# Patient Record
Sex: FEMALE | Race: WHITE | NOT HISPANIC OR LATINO | Employment: OTHER
[De-identification: names, ages, dates, MRNs, and addresses within clinical notes are randomized per-mention and may not be internally consistent; named-entity substitution may affect disease eponyms.]

---

## 2017-06-10 ENCOUNTER — HEALTH MAINTENANCE LETTER (OUTPATIENT)
Age: 62
End: 2017-06-10

## 2018-08-10 ENCOUNTER — THERAPY VISIT (OUTPATIENT)
Dept: PHYSICAL THERAPY | Facility: CLINIC | Age: 63
End: 2018-08-10
Payer: COMMERCIAL

## 2018-08-10 DIAGNOSIS — M25.562 LEFT KNEE PAIN: ICD-10-CM

## 2018-08-10 DIAGNOSIS — Z96.659 S/P KNEE REPLACEMENT: Primary | ICD-10-CM

## 2018-08-10 PROCEDURE — 97110 THERAPEUTIC EXERCISES: CPT | Mod: GP | Performed by: PHYSICAL THERAPIST

## 2018-08-10 PROCEDURE — 97161 PT EVAL LOW COMPLEX 20 MIN: CPT | Mod: GP | Performed by: PHYSICAL THERAPIST

## 2018-08-10 ASSESSMENT — ACTIVITIES OF DAILY LIVING (ADL)
AS_A_RESULT_OF_YOUR_KNEE_INJURY,_HOW_WOULD_YOU_RATE_YOUR_CURRENT_LEVEL_OF_DAILY_ACTIVITY?: ABNORMAL
GO UP STAIRS: ACTIVITY IS SOMEWHAT DIFFICULT
SIT WITH YOUR KNEE BENT: ACTIVITY IS VERY DIFFICULT
HOW_WOULD_YOU_RATE_THE_CURRENT_FUNCTION_OF_YOUR_KNEE_DURING_YOUR_USUAL_DAILY_ACTIVITIES_ON_A_SCALE_FROM_0_TO_100_WITH_100_BEING_YOUR_LEVEL_OF_KNEE_FUNCTION_PRIOR_TO_YOUR_INJURY_AND_0_BEING_THE_INABILITY_TO_PERFORM_ANY_OF_YOUR_USUAL_DAILY_ACTIVITIES?: 50
SQUAT: I AM UNABLE TO DO THE ACTIVITY
LIMPING: NOT ANSWERED
KNEE_ACTIVITY_OF_DAILY_LIVING_SUM: 24
KNEEL ON THE FRONT OF YOUR KNEE: I AM UNABLE TO DO THE ACTIVITY
PAIN: I DO NOT HAVE THE SYMPTOM
WEAKNESS: NOT ANSWERED
RISE FROM A CHAIR: ACTIVITY IS SOMEWHAT DIFFICULT
WALK: ACTIVITY IS SOMEWHAT DIFFICULT
GO DOWN STAIRS: ACTIVITY IS SOMEWHAT DIFFICULT
SWELLING: NOT ANSWERED
STIFFNESS: THE SYMPTOM AFFECTS MY ACTIVITY SLIGHTLY
GIVING WAY, BUCKLING OR SHIFTING OF KNEE: NOT ANSWERED
HOW_WOULD_YOU_RATE_THE_OVERALL_FUNCTION_OF_YOUR_KNEE_DURING_YOUR_USUAL_DAILY_ACTIVITIES?: SEVERELY ABNORMAL
STAND: ACTIVITY IS SOMEWHAT DIFFICULT

## 2018-08-10 NOTE — LETTER
[facility]  [address]  [facility] [address]  [phone]    2018    Re: Cait Raza   :   1955  MRN:  [id number]   REFERRING PHYSICIAN:   Lee Moore    [facility]    Date of Initial Evaluation: 8/10/2018  Visits:  Rxs Used: 1  Reason for Referral:     S/P knee replacement  Left knee pain    EVALUATION SUMMARY  [facility] Initial Evaluation  Subjective:  Patient is a 63 year old female presenting with rehab left knee hpi. The history is provided by the patient.   Cait Raza is a 63 year old female with a left knee condition.      This is a new condition  Patient had left medial compartmental knee replacement on 2018.  She reports a long history of left knee problems and thinks she went into the surgery with a mild loss of extension.  Currently patient rates her pain at 7/10..    Patient reports pain:  In the joint.    Pain is described as aching and sharp and is constant and reported as 7/10.  Associated symptoms:  Edema, loss of strength and loss of motion/stiffness. Pain is the same all the time.  Symptoms are exacerbated by weight bearing and walking (bending knee) and relieved by analgesics.  Since onset symptoms are gradually improving.        General health as reported by patient is good. Pertinent medical history includes:  Cancer, overweight, implanted devices and menopausal. Other surgeries include:  Cancer surgery and orthopedic surgery.  Current medications:  Anti-inflammatory and pain medication.  Current occupation is retired.                   Objective:    Gait:    Gait Type:  Antalgic   Assistive Devices:  Walker  Deviations:  Knee:  Knee extension decr L and knee flexion decr L    Knee Evaluation:  ROM:  Strength wnl knee: patient is able to complete 10 independent SLR with mild extensor lag.  AROM    Hyperextension: Left:  0     Right:  Extension: Left: 10    Right:   Flexion: Left: 85   Right:    Strength:   Quad Set Left: Fair    Pain:     Edema:  Edema of the knee: moderate consistant with recent knee replacement.    Assessment/Plan:    Patient is a 63 year old female with left side knee complaints.    Patient has the following significant findings with corresponding treatment plan.                Diagnosis 1:  S/p left unicompartmental knee replacement  Pain -  manual therapy, self management, education and home program  Decreased ROM/flexibility - manual therapy and therapeutic exercise  Decreased strength - therapeutic exercise and therapeutic activities  Impaired gait - gait training  Decreased function - therapeutic activities    Therapy Evaluation Codes:   1) History comprised of:   Personal factors that impact the plan of care:      None.    Comorbidity factors that impact the plan of care are:      None.     Medications impacting care: None.  2) Examination of Body Systems comprised of:   Body structures and functions that impact the plan of care:      Knee.   Activity limitations that impact the plan of care are:      Stairs and Walking.  3) Clinical presentation characteristics are:   Stable/Uncomplicated.  4) Decision-Making    Low complexity using standardized patient assessment instrument and/or measureable assessment of functional outcome.  Cumulative Therapy Evaluation is: Low complexity.    Previous and current functional limitations:  (See Goal Flow Sheet for this information)    Short term and Long term goals: (See Goal Flow Sheet for this information)     Communication ability:  Patient appears to be able to clearly communicate and understand verbal and written communication and follow directions correctly.  Treatment Explanation - The following has been discussed with the patient:   RX ordered/plan of care  Anticipated outcomes  Possible risks and side effects  This patient would benefit from PT intervention to resume  normal activities.   Rehab potential is good.    Frequency:     2-3X week, once daily  Duration:  for 6 visits  Discharge Plan:  Achieve all LTG.  Independent in home treatment program.  Reach maximal therapeutic benefit.    Please refer to the daily flowsheet for treatment today, total treatment time and time spent performing 1:1 timed codes.     Thank you for your referral.    INQUIRIES  Therapist: Mayra Kelley PT, \[facility]\""  [facility]  [address] [place], [place] [address] [address]  Phone: [phone]  Fax: [phone]

## 2018-08-10 NOTE — PROGRESS NOTES
[facility] Initial Evaluation  Subjective:  Patient is a 63 year old female presenting with rehab left knee hpi. The history is provided by the patient.   Cait Raza is a 63 year old female with a left knee condition.      This is a new condition  Patient had left medial compartmental knee replacement on 8/8/2018.  She reports a long history of left knee problems and thinks she went into the surgery with a mild loss of extension.  Currently patient rates her pain at 7/10..    Patient reports pain:  In the joint.    Pain is described as aching and sharp and is constant and reported as 7/10.  Associated symptoms:  Edema, loss of strength and loss of motion/stiffness. Pain is the same all the time.  Symptoms are exacerbated by weight bearing and walking (bending knee) and relieved by analgesics.  Since onset symptoms are gradually improving.        General health as reported by patient is good.                                              Objective:    Gait:    Gait Type:  Antalgic   Assistive Devices:  Walker  Deviations:  Knee:  Knee extension decr L and knee flexion decr L                                                      Knee Evaluation:  ROM:  Strength wnl knee: patient is able to complete 10 independent SLR with mild extensor lag.  AROM    Hyperextension: Left:  0    Right:  Extension: Left: 10    Right:   Flexion: Left: 85   Right:        Strength:         Quad Set Left: Fair    Pain:           Edema:  Edema of the knee: moderate consistant with recent knee replacement.            General     ROS    Assessment/Plan:    Patient is a 63 year old female with left side knee complaints.    Patient has the following significant findings with corresponding treatment plan.                Diagnosis 1:  S/p left unicompartmental knee replacement  Pain -  manual therapy, self management, education and home program  Decreased ROM/flexibility - manual therapy and therapeutic exercise  Decreased  strength - therapeutic exercise and therapeutic activities  Impaired gait - gait training  Decreased function - therapeutic activities    Therapy Evaluation Codes:   1) History comprised of:   Personal factors that impact the plan of care:      None.    Comorbidity factors that impact the plan of care are:      None.     Medications impacting care: None.  2) Examination of Body Systems comprised of:   Body structures and functions that impact the plan of care:      Knee.   Activity limitations that impact the plan of care are:      Stairs and Walking.  3) Clinical presentation characteristics are:   Stable/Uncomplicated.  4) Decision-Making    Low complexity using standardized patient assessment instrument and/or measureable assessment of functional outcome.  Cumulative Therapy Evaluation is: Low complexity.    Previous and current functional limitations:  (See Goal Flow Sheet for this information)    Short term and Long term goals: (See Goal Flow Sheet for this information)     Communication ability:  Patient appears to be able to clearly communicate and understand verbal and written communication and follow directions correctly.  Treatment Explanation - The following has been discussed with the patient:   RX ordered/plan of care  Anticipated outcomes  Possible risks and side effects  This patient would benefit from PT intervention to resume normal activities.   Rehab potential is good.    Frequency:     2-3X week, once daily  Duration:  for 6 visits  Discharge Plan:  Achieve all LTG.  Independent in home treatment program.  Reach maximal therapeutic benefit.    Please refer to the daily flowsheet for treatment today, total treatment time and time spent performing 1:1 timed codes.

## 2018-08-13 ENCOUNTER — THERAPY VISIT (OUTPATIENT)
Dept: PHYSICAL THERAPY | Facility: CLINIC | Age: 63
End: 2018-08-13
Payer: COMMERCIAL

## 2018-08-13 DIAGNOSIS — M25.562 LEFT KNEE PAIN: ICD-10-CM

## 2018-08-13 DIAGNOSIS — Z96.659 S/P KNEE REPLACEMENT: ICD-10-CM

## 2018-08-13 PROCEDURE — 97110 THERAPEUTIC EXERCISES: CPT | Mod: GP | Performed by: PHYSICAL THERAPIST

## 2018-08-13 PROCEDURE — 97140 MANUAL THERAPY 1/> REGIONS: CPT | Mod: GP | Performed by: PHYSICAL THERAPIST

## 2018-08-13 PROCEDURE — 97112 NEUROMUSCULAR REEDUCATION: CPT | Mod: GP | Performed by: PHYSICAL THERAPIST

## 2018-08-13 NOTE — PROGRESS NOTES
[facility] Initial Evaluation  Subjective:  Patient is a 63 year old female presenting with rehab left ankle/foot hpi.                                      Pertinent medical history includes:  Cancer, overweight, implanted devices and menopausal.    Other surgeries include:  Cancer surgery and orthopedic surgery.  Current medications:  Anti-inflammatory and pain medication.  Current occupation is retired.                                    Objective:  System    Physical Exam    General     ROS    Assessment/Plan:

## 2018-08-16 ENCOUNTER — THERAPY VISIT (OUTPATIENT)
Dept: PHYSICAL THERAPY | Facility: CLINIC | Age: 63
End: 2018-08-16
Payer: COMMERCIAL

## 2018-08-16 DIAGNOSIS — M25.562 LEFT KNEE PAIN: ICD-10-CM

## 2018-08-16 DIAGNOSIS — Z96.659 S/P KNEE REPLACEMENT: ICD-10-CM

## 2018-08-16 PROCEDURE — 97110 THERAPEUTIC EXERCISES: CPT | Mod: GP | Performed by: PHYSICAL THERAPIST

## 2018-08-16 PROCEDURE — 97112 NEUROMUSCULAR REEDUCATION: CPT | Mod: GP | Performed by: PHYSICAL THERAPIST

## 2018-08-16 PROCEDURE — 97140 MANUAL THERAPY 1/> REGIONS: CPT | Mod: GP | Performed by: PHYSICAL THERAPIST

## 2018-08-20 ENCOUNTER — THERAPY VISIT (OUTPATIENT)
Dept: PHYSICAL THERAPY | Facility: CLINIC | Age: 63
End: 2018-08-20
Payer: COMMERCIAL

## 2018-08-20 DIAGNOSIS — Z96.659 S/P KNEE REPLACEMENT: ICD-10-CM

## 2018-08-20 DIAGNOSIS — M25.562 LEFT KNEE PAIN: ICD-10-CM

## 2018-08-20 PROCEDURE — 97112 NEUROMUSCULAR REEDUCATION: CPT | Mod: GP | Performed by: PHYSICAL THERAPIST

## 2018-08-20 PROCEDURE — 97110 THERAPEUTIC EXERCISES: CPT | Mod: GP | Performed by: PHYSICAL THERAPIST

## 2018-08-20 PROCEDURE — 97140 MANUAL THERAPY 1/> REGIONS: CPT | Mod: GP | Performed by: PHYSICAL THERAPIST

## 2018-08-23 ENCOUNTER — THERAPY VISIT (OUTPATIENT)
Dept: PHYSICAL THERAPY | Facility: CLINIC | Age: 63
End: 2018-08-23
Payer: COMMERCIAL

## 2018-08-23 DIAGNOSIS — Z96.659 S/P KNEE REPLACEMENT: ICD-10-CM

## 2018-08-23 DIAGNOSIS — M25.562 LEFT KNEE PAIN: ICD-10-CM

## 2018-08-23 PROCEDURE — 97110 THERAPEUTIC EXERCISES: CPT | Mod: GP | Performed by: PHYSICAL THERAPIST

## 2018-08-23 PROCEDURE — 97140 MANUAL THERAPY 1/> REGIONS: CPT | Mod: GP | Performed by: PHYSICAL THERAPIST

## 2018-08-27 ENCOUNTER — THERAPY VISIT (OUTPATIENT)
Dept: PHYSICAL THERAPY | Facility: CLINIC | Age: 63
End: 2018-08-27
Payer: COMMERCIAL

## 2018-08-27 DIAGNOSIS — Z96.659 S/P KNEE REPLACEMENT: ICD-10-CM

## 2018-08-27 DIAGNOSIS — M25.562 LEFT KNEE PAIN: ICD-10-CM

## 2018-08-27 PROCEDURE — 97112 NEUROMUSCULAR REEDUCATION: CPT | Mod: GP | Performed by: PHYSICAL THERAPIST

## 2018-08-27 PROCEDURE — 97110 THERAPEUTIC EXERCISES: CPT | Mod: GP | Performed by: PHYSICAL THERAPIST

## 2018-08-27 PROCEDURE — 97140 MANUAL THERAPY 1/> REGIONS: CPT | Mod: GP | Performed by: PHYSICAL THERAPIST

## 2018-08-30 ENCOUNTER — THERAPY VISIT (OUTPATIENT)
Dept: PHYSICAL THERAPY | Facility: CLINIC | Age: 63
End: 2018-08-30
Payer: COMMERCIAL

## 2018-08-30 DIAGNOSIS — Z96.659 S/P KNEE REPLACEMENT: ICD-10-CM

## 2018-08-30 DIAGNOSIS — M25.562 LEFT KNEE PAIN: ICD-10-CM

## 2018-08-30 PROCEDURE — 97110 THERAPEUTIC EXERCISES: CPT | Mod: GP | Performed by: PHYSICAL THERAPIST

## 2018-08-30 PROCEDURE — 97112 NEUROMUSCULAR REEDUCATION: CPT | Mod: GP | Performed by: PHYSICAL THERAPIST

## 2018-08-30 PROCEDURE — 97140 MANUAL THERAPY 1/> REGIONS: CPT | Mod: GP | Performed by: PHYSICAL THERAPIST

## 2018-09-04 ENCOUNTER — THERAPY VISIT (OUTPATIENT)
Dept: PHYSICAL THERAPY | Facility: CLINIC | Age: 63
End: 2018-09-04
Payer: COMMERCIAL

## 2018-09-04 DIAGNOSIS — Z96.659 S/P KNEE REPLACEMENT: ICD-10-CM

## 2018-09-04 DIAGNOSIS — M25.562 LEFT KNEE PAIN: ICD-10-CM

## 2018-09-04 PROCEDURE — 97140 MANUAL THERAPY 1/> REGIONS: CPT | Mod: GP | Performed by: PHYSICAL THERAPIST

## 2018-09-04 PROCEDURE — 97112 NEUROMUSCULAR REEDUCATION: CPT | Mod: GP | Performed by: PHYSICAL THERAPIST

## 2018-09-04 PROCEDURE — 97110 THERAPEUTIC EXERCISES: CPT | Mod: GP | Performed by: PHYSICAL THERAPIST

## 2018-09-06 ENCOUNTER — THERAPY VISIT (OUTPATIENT)
Dept: PHYSICAL THERAPY | Facility: CLINIC | Age: 63
End: 2018-09-06
Payer: COMMERCIAL

## 2018-09-06 DIAGNOSIS — M25.562 LEFT KNEE PAIN: ICD-10-CM

## 2018-09-06 DIAGNOSIS — Z96.659 S/P KNEE REPLACEMENT: ICD-10-CM

## 2018-09-06 PROCEDURE — 97112 NEUROMUSCULAR REEDUCATION: CPT | Mod: GP | Performed by: PHYSICAL THERAPIST

## 2018-09-06 PROCEDURE — 97110 THERAPEUTIC EXERCISES: CPT | Mod: GP | Performed by: PHYSICAL THERAPIST

## 2018-09-06 PROCEDURE — 97140 MANUAL THERAPY 1/> REGIONS: CPT | Mod: GP | Performed by: PHYSICAL THERAPIST

## 2018-09-17 ENCOUNTER — THERAPY VISIT (OUTPATIENT)
Dept: PHYSICAL THERAPY | Facility: CLINIC | Age: 63
End: 2018-09-17
Payer: COMMERCIAL

## 2018-09-17 DIAGNOSIS — Z96.659 S/P KNEE REPLACEMENT: ICD-10-CM

## 2018-09-17 DIAGNOSIS — M25.562 LEFT KNEE PAIN: ICD-10-CM

## 2018-09-17 PROCEDURE — 97110 THERAPEUTIC EXERCISES: CPT | Mod: GP | Performed by: PHYSICAL THERAPIST

## 2018-09-17 PROCEDURE — 97112 NEUROMUSCULAR REEDUCATION: CPT | Mod: GP | Performed by: PHYSICAL THERAPIST

## 2018-09-17 PROCEDURE — 97140 MANUAL THERAPY 1/> REGIONS: CPT | Mod: GP | Performed by: PHYSICAL THERAPIST

## 2018-09-24 ENCOUNTER — THERAPY VISIT (OUTPATIENT)
Dept: PHYSICAL THERAPY | Facility: CLINIC | Age: 63
End: 2018-09-24
Payer: COMMERCIAL

## 2018-09-24 DIAGNOSIS — M25.562 LEFT KNEE PAIN: ICD-10-CM

## 2018-09-24 DIAGNOSIS — Z96.659 S/P KNEE REPLACEMENT: ICD-10-CM

## 2018-09-24 PROCEDURE — 97110 THERAPEUTIC EXERCISES: CPT | Mod: GP | Performed by: PHYSICAL THERAPIST

## 2018-09-24 PROCEDURE — 97140 MANUAL THERAPY 1/> REGIONS: CPT | Mod: GP | Performed by: PHYSICAL THERAPIST

## 2018-09-24 PROCEDURE — 97112 NEUROMUSCULAR REEDUCATION: CPT | Mod: GP | Performed by: PHYSICAL THERAPIST

## 2018-09-27 ENCOUNTER — THERAPY VISIT (OUTPATIENT)
Dept: PHYSICAL THERAPY | Facility: CLINIC | Age: 63
End: 2018-09-27
Payer: COMMERCIAL

## 2018-09-27 DIAGNOSIS — M25.512 SHOULDER PAIN, LEFT: Primary | ICD-10-CM

## 2018-09-27 PROCEDURE — 97161 PT EVAL LOW COMPLEX 20 MIN: CPT | Mod: GP | Performed by: PHYSICAL THERAPIST

## 2018-09-27 PROCEDURE — 97110 THERAPEUTIC EXERCISES: CPT | Mod: GP | Performed by: PHYSICAL THERAPIST

## 2018-09-27 NOTE — MR AVS SNAPSHOT
After Visit Summary   9/27/2018    Cait Raza    MRN: [id number]           Patient Information     Date Of Birth          1955        Visit Information        Provider Department      9/27/2018 10:00 AM Trinh Gomez, PT [facility]        Today's Diagnoses     Shoulder pain, left    -  1       Follow-ups after your visit        Your next 10 appointments already scheduled     Oct 01, 2018 10:00 AM CDT   SHAISTA Extremity with Trinh Gomez PT   [facility] (SHAISTA Taryn  )    [address]  [facility] [address] [phone]            Oct 08, 2018 12:00 PM CDT   SHAISTA Extremity with Trinh Gomez PT   [facility] (SHAISTA Taryn  )    [address] [place] [address] [address]  [facility] [id number] [phone]              Who to contact     If you have questions or need follow up information about today's clinic visit or your schedule please contact [facility] directly at [phone].  Normal or non-critical lab and imaging results will be communicated to you by EoeMobilehart, letter or phone within 4 business days after the clinic has received the results. If you do not hear from us within 7 days, please contact the clinic through EoeMobilehart or phone. If you have a critical or abnormal lab result, we will notify you by phone as soon as possible.  Submit refill requests through CloudCar or call your pharmacy and they will forward the refill request to us. Please allow 3 business days for your refill to be completed.          Additional Information About Your Visit        MyChart Information     CloudCar gives you secure access to your electronic health record. If you see a primary care provider, you can also send messages to your care team and make appointments. If you have questions, please call your primary care  clinic.  If you do not have a primary care provider, please call [phone] and they will assist you.        Care EveryWhere ID     This is your Care EveryWhere ID. This could be used by other organizations to access your [place] medical records  [id number]        Your Vitals Were     Last Period                   07/26/2004            Blood Pressure from Last 3 Encounters:   10/09/15 139/81   08/28/15 123/72   08/14/15 122/89    Weight from Last 3 Encounters:   10/09/15 80.8 kg (178 lb 1.6 oz)   08/28/15 79.3 kg (174 lb 14.4 oz)   08/14/15 78.4 kg (172 lb 14.4 oz)              We Performed the Following     HC PT EVAL, LOW COMPLEXITY     SHAISTA INITIAL EVAL REPORT     THERAPEUTIC EXERCISES        Primary Care Provider Office Phone # Fax #    James Graham [phone]       [facility] [address] [place] [id number]        Equal Access to Services     ANNIA PIRES : Hadii aad ku hadasho Soomaali, waaxda luqadaha, qaybta kaalmada adeegyada, waxay idiin haylloydn shubham alvarez . So [facility] [phone].    ATENCIÓN: Si lorena samuels, tiene a rodas disposición servicios gratuitos de asistencia lingüística. Llame al [phone].    We comply with applicable federal civil rights laws and [place] laws. We do not discriminate on the basis of race, color, national origin, age, disability, sex, sexual orientation, or gender identity.            Thank you!     Thank you for choosing [facility]  for your care. Our goal is always to provide you with excellent care. Hearing back from our patients is one way we can continue to improve our services. Please take a few minutes to complete the written survey that you may receive in the mail after your visit with us. Thank you!             Your Updated Medication List - Protect others around you: Learn how to safely use, store and throw away your medicines at www.disposemymeds.org.          This list is  accurate as of 9/27/18 11:59 PM.  Always use your most recent med list.                   Brand Name Dispense Instructions for use Diagnosis    PUNEET-MAG PO      Take  by mouth daily.        cholecalciferol 1000 units capsule    vitamin  -D     Take 1 capsule by mouth daily.        FISH OIL CONCENTRATE PO      Take  by mouth 2 times daily.        KRILL OIL PO      Take by mouth daily        MULTIVITAMIN TABS   OR      one a day        PROBIATA PO      Take by mouth daily        TOPROL XL 25 MG 24 hr tablet   Generic drug:  metoprolol succinate      Take 1 tablet by mouth daily.
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
## 2018-10-01 ENCOUNTER — THERAPY VISIT (OUTPATIENT)
Dept: PHYSICAL THERAPY | Facility: CLINIC | Age: 63
End: 2018-10-01
Payer: COMMERCIAL

## 2018-10-01 DIAGNOSIS — M25.512 SHOULDER PAIN, LEFT: Primary | ICD-10-CM

## 2018-10-01 PROCEDURE — 97140 MANUAL THERAPY 1/> REGIONS: CPT | Mod: GP | Performed by: PHYSICAL THERAPIST

## 2018-10-01 PROCEDURE — 97110 THERAPEUTIC EXERCISES: CPT | Mod: GP | Performed by: PHYSICAL THERAPIST
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
## 2018-10-01 NOTE — PROGRESS NOTES
[facility] Initial Evaluation  Subjective:  Patient is a 63 year old female presenting with rehab left ankle/foot hpi.                       Objective:  System    Physical Exam    General     ROS    Assessment/Plan:

## 2018-10-08 ENCOUNTER — THERAPY VISIT (OUTPATIENT)
Dept: PHYSICAL THERAPY | Facility: CLINIC | Age: 63
End: 2018-10-08
Payer: COMMERCIAL

## 2018-10-08 DIAGNOSIS — M25.512 SHOULDER PAIN, LEFT: ICD-10-CM

## 2018-10-08 PROCEDURE — 97140 MANUAL THERAPY 1/> REGIONS: CPT | Mod: GP | Performed by: PHYSICAL THERAPIST

## 2018-10-08 PROCEDURE — 97110 THERAPEUTIC EXERCISES: CPT | Mod: GP | Performed by: PHYSICAL THERAPIST

## 2018-10-15 ENCOUNTER — THERAPY VISIT (OUTPATIENT)
Dept: PHYSICAL THERAPY | Facility: CLINIC | Age: 63
End: 2018-10-15
Payer: COMMERCIAL

## 2018-10-15 DIAGNOSIS — Z96.659 S/P KNEE REPLACEMENT: ICD-10-CM

## 2018-10-15 DIAGNOSIS — M25.562 LEFT KNEE PAIN: ICD-10-CM

## 2018-10-15 DIAGNOSIS — M25.512 SHOULDER PAIN, LEFT: ICD-10-CM

## 2018-10-15 PROCEDURE — 97140 MANUAL THERAPY 1/> REGIONS: CPT | Mod: GP | Performed by: PHYSICAL THERAPIST

## 2018-10-15 PROCEDURE — 97110 THERAPEUTIC EXERCISES: CPT | Mod: GP | Performed by: PHYSICAL THERAPIST

## 2018-10-15 PROCEDURE — 97112 NEUROMUSCULAR REEDUCATION: CPT | Mod: GP | Performed by: PHYSICAL THERAPIST

## 2019-07-15 ENCOUNTER — THERAPY VISIT (OUTPATIENT)
Dept: PHYSICAL THERAPY | Facility: CLINIC | Age: 64
End: 2019-07-15
Payer: COMMERCIAL

## 2019-07-15 DIAGNOSIS — M77.01 MEDIAL EPICONDYLITIS OF ELBOW, RIGHT: ICD-10-CM

## 2019-07-15 PROCEDURE — 97161 PT EVAL LOW COMPLEX 20 MIN: CPT | Mod: GP | Performed by: PHYSICAL THERAPIST

## 2019-07-15 PROCEDURE — 97110 THERAPEUTIC EXERCISES: CPT | Mod: GP | Performed by: PHYSICAL THERAPIST

## 2019-07-16 NOTE — PROGRESS NOTES
[facility] Initial Evaluation  Subjective:  Patient reports onset of medial right epicondyle sharp 6/10 pain May 2019 as she increased resistance in pool activities with foam dumbbells.  She had been doing really well with that type of workout tennis and golf.  Decided to increase resistance in the pool and felt sharp medial elbow pain.  She has been resting icing and that has been helpful.  She does have pain that is worse in the morning and she does feel she sleeps with her elbow fully flexed.    Playing tennis 3 times a week and has pain with a full tamiko stroke.  She is not limited with serving nor backhand.  Patient is right-handed.  Medical history of osteoporosis and cancer.                          Objective:  System                        ROM:  AROM:      Flexion Elbow:  Left:172   Right:158  Extension Elbow:  Right: Minimal loss                Pain: Moderate loss right supination with medial pain end range  Endfeel: springy end feel with extension and supination right elbow  Strength:    Flexion Elbow:  Right:/5 strong/painful Pain:          Pronation Elbow/Wrist: Right:/5 strong/painful Pain:    Ulnar Deviation: Right:/5 Pain:+    Special Testing:      Right wrist/elbow positive for the following special tests: Medial Epicondylitis and Pronator Teres  Palpation:    Biceps left elbow/wrist: brachialis.  Right wrist/elbow tenderness present at:Medial Epicondyle; Pronator Teres; Wrist Flexors and Biceps (brachialis)  Mobility:    Proximal Radioulnar:  Right:  Hypomobile                                        General     ROS    Assessment/Plan:    Patient is a 64 year old female with right side elbow complaints.    Patient has the following significant findings with corresponding treatment plan.                Diagnosis 1: Right elbow pain Pain -  hot/cold therapy, manual therapy and self management  Decreased ROM/flexibility - manual therapy and therapeutic exercise  Decreased joint  mobility - manual therapy and therapeutic exercise  Decreased strength - therapeutic exercise and therapeutic activities  Impaired muscle performance - neuro re-education  Decreased function - therapeutic activities    Therapy Evaluation Codes:   1) History comprised of:   Personal factors that impact the plan of care:      None.    Comorbidity factors that impact the plan of care are:      None.     Medications impacting care: None.  2) Examination of Body Systems comprised of:   Body structures and functions that impact the plan of care:      Elbow.   Activity limitations that impact the plan of care are:      Driving, Dressing, Grasping and Lifting.  3) Clinical presentation characteristics are:   Stable/Uncomplicated.  4) Decision-Making    Low complexity using standardized patient assessment instrument and/or measureable assessment of functional outcome.  Cumulative Therapy Evaluation is: Low complexity.    Previous and current functional limitations:  (See Goal Flow Sheet for this information)    Short term and Long term goals: (See Goal Flow Sheet for this information)     Communication ability:  Patient appears to be able to clearly communicate and understand verbal and written communication and follow directions correctly.  Treatment Explanation - The following has been discussed with the patient:   RX ordered/plan of care  Anticipated outcomes  Possible risks and side effects  This patient would benefit from PT intervention to resume normal activities.   Rehab potential is excellent.    Frequency:  1 X week, once daily  Duration:  for 6 weeks  Discharge Plan:  Achieve all LTG.  Independent in home treatment program.  Reach maximal therapeutic benefit.    Please refer to the daily flowsheet for treatment today, total treatment time and time spent performing 1:1 timed codes.

## 2019-07-17 PROBLEM — M77.01 MEDIAL EPICONDYLITIS OF ELBOW, RIGHT: Status: ACTIVE | Noted: 2019-07-17

## 2019-07-22 ENCOUNTER — THERAPY VISIT (OUTPATIENT)
Dept: PHYSICAL THERAPY | Facility: CLINIC | Age: 64
End: 2019-07-22
Payer: COMMERCIAL

## 2019-07-22 DIAGNOSIS — M77.01 MEDIAL EPICONDYLITIS OF ELBOW, RIGHT: ICD-10-CM

## 2019-07-22 PROCEDURE — 97140 MANUAL THERAPY 1/> REGIONS: CPT | Mod: GP | Performed by: PHYSICAL THERAPIST

## 2019-07-22 PROCEDURE — 97110 THERAPEUTIC EXERCISES: CPT | Mod: GP | Performed by: PHYSICAL THERAPIST

## 2019-07-22 NOTE — TELEPHONE ENCOUNTER
RECORDS STATUS - BREAST    RECORDS REQUESTED FROM: JEIMY   DATE REQUESTED: 07/22/2019   NOTES DETAILS STATUS   OFFICE NOTE from referring provider SELF REF RECORDS IN CE   OFFICE NOTE from medical oncologist YES IN EPIC   OFFICE NOTE from surgeon YES IN CE AND EPIC   OFFICE NOTE from radiation oncologist NA    DISCHARGE SUMMARY from hospital YES IN [facility]   DISCHARGE REPORT from the ER NA    OPERATIVE REPORT YES IN [facility]   MEDICATION LIST YES IN [facility]   CLINICAL TRIAL TREATMENTS TO DATE NA    LABS YES IN [facility]   PATHOLOGY REPORTS  (Tissue diagnosis, Stage, ER/IA percentage positive and intensity of staining, HER2 IHC, FISH, and all biopsies from breast and any distant metastasis)                 YES IN EPIC   GENONOMIC TESTING NA    TYPE:   (Next Generation Sequencing, including Foundation One testing, and Oncotype score)     IMAGING (NEED IMAGES & REPORT) YES    CT SCANS YES IN PACS   MRI YES IN PACS   MAMMO YES IN PACS   ULTRASOUND YES IN PACS   PET NA    BONE SCAN YES    BRAIN MRI NA IN PACS

## 2019-07-22 NOTE — TELEPHONE ENCOUNTER
ONCOLOGY INTAKE: Records Information      APPT INFORMATION:  Referring provider:  Self  Referring provider s clinic:  n/a  Reason for visit/diagnosis:  Breast Cancer  Has patient been notified of appointment date and time?: Yes    RECORDS INFORMATION:  Were the records received with the referral (via [employer])? No    Has patient been seen for any external appt for this diagnosis? Yes    If yes, where? [facility]    Has patient had any imaging or procedures outside of [place] for this condition?yes      If Yes, where? [facility]    ADDITIONAL INFORMATION:  Patient was seen here 3 years ago-she has been f/u with her internist at [facility]

## 2019-07-22 NOTE — PROGRESS NOTES
[facility] Initial Evaluation  Subjective:       Pertinent medical history includes:  Cancer, depression, overweight, menopausal and osteoporosis.    Surgeries include:  Cancer surgery, orthopedic surgery and other (bilateral mastectomy and knee replacement). Other surgery history details: two C-sections.  Current medications:  Other. Other medications details: toprol, pravastatin.              Patient is retired.                           Objective:  System    Physical Exam    General     ROS    Assessment/Plan:

## 2019-07-30 ENCOUNTER — PRE VISIT (OUTPATIENT)
Dept: ONCOLOGY | Facility: CLINIC | Age: 64
End: 2019-07-30

## 2019-08-19 ENCOUNTER — THERAPY VISIT (OUTPATIENT)
Dept: PHYSICAL THERAPY | Facility: CLINIC | Age: 64
End: 2019-08-19
Payer: COMMERCIAL

## 2019-08-19 DIAGNOSIS — M25.512 SHOULDER PAIN, LEFT: ICD-10-CM

## 2019-08-19 DIAGNOSIS — M77.01 MEDIAL EPICONDYLITIS OF ELBOW, RIGHT: ICD-10-CM

## 2019-08-19 PROCEDURE — 97140 MANUAL THERAPY 1/> REGIONS: CPT | Mod: GP | Performed by: PHYSICAL THERAPIST

## 2019-08-19 PROCEDURE — 97110 THERAPEUTIC EXERCISES: CPT | Mod: GP | Performed by: PHYSICAL THERAPIST

## 2019-08-19 NOTE — PROGRESS NOTES
Discharge Note    Progress reporting period is from initial eval/last PN to Aug 19, 2019.     Patient seen for 3 visits.    SUBJECTIVE  Subjective changes noted by patient:  Patient reports significant improvement in right elbow pain with ADLs.  Play tennis and golf with minimal limitation she still does elbow medial upper arm pain with lifting resistance into elbow flexion with rotation  .  Changes in function:  Yes (See Goal flowsheet attached for changes in current functional level)  Adverse reaction to treatment or activity: None    OBJECTIVE  Changes noted in objective findings: Right elbow PROM flexion and extension full.  Moderate tenderness medial mid to distal brachialis     ASSESSMENT/PLAN  Diagnosis: Right elbow pain medial   DIAGP:  The encounter diagnosis was Medial epicondylitis of elbow, right.  Updated problem list and treatment plan:   Decreased strength - HEP  STG/LTGs have been met or progress has been made towards goals:  Yes, please see goal flowsheet for most current information  Assessment of Progress: current status is unknown.    Last current status:     Self Management Plans:  HEP  I have re-evaluated this patient and find that the nature, scope, duration and intensity of the therapy is appropriate for the medical condition of the patient.  Cait continues to require the following intervention to meet STG and LTG's:  HEP.    Recommendations:  Discharge with current home program.  Patient to follow up with MD as needed.    Please refer to the daily flowsheet for treatment today, total treatment time and time spent performing 1:1 timed codes.

## 2019-09-30 ENCOUNTER — HEALTH MAINTENANCE LETTER (OUTPATIENT)
Age: 64
End: 2019-09-30

## 2019-10-07 ENCOUNTER — TRANSFERRED RECORDS (OUTPATIENT)
Dept: HEALTH INFORMATION MANAGEMENT | Facility: CLINIC | Age: 64
End: 2019-10-07

## 2020-04-13 PROBLEM — M25.512 SHOULDER PAIN, LEFT: Status: RESOLVED | Noted: 2018-10-01 | Resolved: 2020-04-13

## 2020-04-13 NOTE — PROGRESS NOTES
Discharge Note    Progress reporting period is from initial eval/last PN to Aug 19, 2019.     Patient seen for 3 visits.    SUBJECTIVE  Subjective changes noted by patient:  Patient reports significant improvement in right elbow pain with ADLs.  Play tennis and golf with minimal limitation she still does elbow medial upper arm pain with lifting resistance into elbow flexion with rotation  .  Changes in function:  Yes (See Goal flowsheet attached for changes in current functional level)  Adverse reaction to treatment or activity: None    OBJECTIVE  Changes noted in objective findings: Right elbow PROM flexion and extension full.  Moderate tenderness medial mid to distal brachialis     ASSESSMENT/PLAN  Diagnosis: Right elbow pain medial   DIAGP:  Diagnoses of Medial epicondylitis of elbow, right and Shoulder pain, left were pertinent to this visit.  Updated problem list and treatment plan:   Decreased function - HEP  STG/LTGs have been met or progress has been made towards goals:  Yes, please see goal flowsheet for most current information  Assessment of Progress: current status is unknown.    Last current status:     Self Management Plans:  HEP  I have re-evaluated this patient and find that the nature, scope, duration and intensity of the therapy is appropriate for the medical condition of the patient.  Cait continues to require the following intervention to meet STG and LTG's:  HEP.    Recommendations:  Discharge with current home program.  Patient to follow up with MD as needed.    Please refer to the daily flowsheet for treatment today, total treatment time and time spent performing 1:1 timed codes.

## 2020-06-03 ENCOUNTER — THERAPY VISIT (OUTPATIENT)
Dept: PHYSICAL THERAPY | Facility: CLINIC | Age: 65
End: 2020-06-03
Payer: COMMERCIAL

## 2020-06-03 DIAGNOSIS — M25.561 RIGHT KNEE PAIN: Primary | ICD-10-CM

## 2020-06-03 DIAGNOSIS — M25.552 HIP PAIN, LEFT: ICD-10-CM

## 2020-06-03 PROCEDURE — 97161 PT EVAL LOW COMPLEX 20 MIN: CPT | Mod: 95 | Performed by: PHYSICAL THERAPIST

## 2020-06-03 PROCEDURE — 97110 THERAPEUTIC EXERCISES: CPT | Mod: 95 | Performed by: PHYSICAL THERAPIST

## 2020-06-04 PROBLEM — M25.552 HIP PAIN, LEFT: Status: ACTIVE | Noted: 2020-06-04

## 2020-06-04 NOTE — PROGRESS NOTES
[facility] Initial Evaluation  Subjective:  Patient reports onset of sharp left hip pain January 2020.  She was hiking and had a sharp stabbing pain in her anterior superior thigh area.  She is improved since the initial onset but continues to have tightness and pain in the area if she is doing more longer walking.  She did [place] of state that revealed mild OA.  Her diagnosis at that time was hip flexor tendinitis.  She feels that she is unsteady occasionally going down inclines or down stairs and sometimes needs to go sideways.  Patient also notes that her right knee has been painful prior to the initial onset of a sharp pain she does have history of bilateral TKA the right in 2016 the left in 2018.  She notes that her right knee has been swollen and painful for a while prior to the sudden onset of left hip pain.  She also notes some tightness into her lower back.  She is now back in [place] for the summer.  Patient is generally active with tennis which she is no longer able to play due to her current symptoms, she is able to bike and swim and walk walking being the more limited activities that she is still doing.      Patient Health History  Cait Raza being seen for left hip/right knee.     Problem began: 12/1/2019.   Problem occurred: living life   Pain is reported as 6/10 on pain scale.  General health as reported by patient is good.  Pertinent medical history includes: cancer, depression, implanted device, menopausal, overweight, osteoporosis and pain at night/rest.     Medical allergies: none.   Surgeries include:  Orthopedic surgery, cancer surgery and other. Other surgery history details: c-sections (2).    Current medications:  Bone density and cardiac medication.    Current occupation is retired.   Primary job tasks include:  Computer work.                                    Objective:  Standing Alignment:                  General deviations alignment: Decreased right  knee extension with mild decreased right weightbearing  in stance.  Gait:  antalgic gait with decreased right knee extension decreased pushoff right left hip hike decreased stance time right decreased hip extension left decreased lumbar extension bilateral stride        Flexibility/Screens:   Positive screens:  Lumbar (Moderate loss lumbar extension)    Lower Extremity:  Decreased left lower extremity flexibility:Hip ER's; Adductors; Piriformis and Hip Flexors    Decreased right lower extremity flexibility:  Hip Flexors                                                 Hip Evaluation        Hip Palpation:    Left hip tenderness present at:   IT Band (Tenderness TF L); hip flexors; ASIS (Significant tenderness and guarding proximal rectus femoris); Adductors; Iliac Crest (Tenderness medial iliac crest near abdominal attachment) and Gluteus Medius         Knee Evaluation:  ROM:      PROM      Extension: Left:   Right:  Approximate -5 compared to the left              Palpation:      Right knee tenderness present at:  Popliteal (Tenderness distal medial hamstring and proximal gastroc)  Edema:    Circumference:      Joint Line:  Right:   moderate swelling joint line and superior posterior right knee            General     ROS    Assessment/Plan:    Patient is a 65 year old female with left side hip and right side knee complaints.    Patient has the following significant findings with corresponding treatment plan.                Diagnosis 1:  Left hip pain right knee pain  Pain -  hot/cold therapy, electric stimulation, manual therapy and self management  Decreased ROM/flexibility - manual therapy and therapeutic exercise  Decreased joint mobility - manual therapy and therapeutic exercise  Decreased strength - therapeutic exercise and therapeutic activities  Edema - electric stimulation and cold therapy  Impaired gait - gait training  Impaired muscle performance - neuro re-education  Decreased function - therapeutic  activities    Therapy Evaluation Codes:   1) History comprised of:   Personal factors that impact the plan of care:      None.    Comorbidity factors that impact the plan of care are:      None.     Medications impacting care: None.  2) Examination of Body Systems comprised of:   Body structures and functions that impact the plan of care:      Hip, Knee and Lumbar spine.   Activity limitations that impact the plan of care are:      Bending, Dressing, Jumping, Lifting, Running, Sitting, Sports, Squatting/kneeling, Stairs and Walking.  3) Clinical presentation characteristics are:   Stable/Uncomplicated.  4) Decision-Making    Low complexity using standardized patient assessment instrument and/or measureable assessment of functional outcome.  Cumulative Therapy Evaluation is: Low complexity.    Previous and current functional limitations:  (See Goal Flow Sheet for this information)    Short term and Long term goals: (See Goal Flow Sheet for this information)     Communication ability:  Patient appears to be able to clearly communicate and understand verbal and written communication and follow directions correctly.  Treatment Explanation - The following has been discussed with the patient:   RX ordered/plan of care  Anticipated outcomes  Possible risks and side effects  This patient would benefit from PT intervention to resume normal activities.   Rehab potential is excellent.    Frequency:  1 X week, once daily  Duration:  for  6 weeks  Discharge Plan:  Achieve all LTG.  Independent in home treatment program.  Reach maximal therapeutic benefit.    Please refer to the daily flowsheet for treatment today, total treatment time and time spent performing 1:1 timed codes.

## 2020-06-16 ENCOUNTER — THERAPY VISIT (OUTPATIENT)
Dept: PHYSICAL THERAPY | Facility: CLINIC | Age: 65
End: 2020-06-16
Payer: COMMERCIAL

## 2020-06-16 DIAGNOSIS — M25.552 HIP PAIN, LEFT: ICD-10-CM

## 2020-06-16 PROCEDURE — 97140 MANUAL THERAPY 1/> REGIONS: CPT | Mod: GP | Performed by: PHYSICAL THERAPIST

## 2020-06-16 PROCEDURE — 97110 THERAPEUTIC EXERCISES: CPT | Mod: GP | Performed by: PHYSICAL THERAPIST

## 2020-06-23 ENCOUNTER — THERAPY VISIT (OUTPATIENT)
Dept: PHYSICAL THERAPY | Facility: CLINIC | Age: 65
End: 2020-06-23
Payer: COMMERCIAL

## 2020-06-23 DIAGNOSIS — M25.552 HIP PAIN, LEFT: ICD-10-CM

## 2020-06-23 PROCEDURE — 97110 THERAPEUTIC EXERCISES: CPT | Mod: GP | Performed by: PHYSICAL THERAPIST

## 2020-06-23 PROCEDURE — 97112 NEUROMUSCULAR REEDUCATION: CPT | Mod: GP | Performed by: PHYSICAL THERAPIST

## 2020-06-23 PROCEDURE — 97140 MANUAL THERAPY 1/> REGIONS: CPT | Mod: GP | Performed by: PHYSICAL THERAPIST

## 2020-06-29 ENCOUNTER — THERAPY VISIT (OUTPATIENT)
Dept: PHYSICAL THERAPY | Facility: CLINIC | Age: 65
End: 2020-06-29
Payer: COMMERCIAL

## 2020-06-29 DIAGNOSIS — M25.552 HIP PAIN, LEFT: ICD-10-CM

## 2020-06-29 PROCEDURE — 97140 MANUAL THERAPY 1/> REGIONS: CPT | Mod: GP | Performed by: PHYSICAL THERAPIST

## 2020-06-29 PROCEDURE — 97112 NEUROMUSCULAR REEDUCATION: CPT | Mod: GP | Performed by: PHYSICAL THERAPIST

## 2020-06-29 PROCEDURE — 97110 THERAPEUTIC EXERCISES: CPT | Mod: GP | Performed by: PHYSICAL THERAPIST

## 2020-07-09 ENCOUNTER — THERAPY VISIT (OUTPATIENT)
Dept: PHYSICAL THERAPY | Facility: CLINIC | Age: 65
End: 2020-07-09
Payer: COMMERCIAL

## 2020-07-09 DIAGNOSIS — M25.552 HIP PAIN, LEFT: ICD-10-CM

## 2020-07-09 PROCEDURE — 97140 MANUAL THERAPY 1/> REGIONS: CPT | Mod: GP | Performed by: PHYSICAL THERAPIST

## 2020-07-09 PROCEDURE — 97112 NEUROMUSCULAR REEDUCATION: CPT | Mod: GP | Performed by: PHYSICAL THERAPIST

## 2020-07-09 PROCEDURE — 97110 THERAPEUTIC EXERCISES: CPT | Mod: GP | Performed by: PHYSICAL THERAPIST

## 2020-07-16 ENCOUNTER — THERAPY VISIT (OUTPATIENT)
Dept: PHYSICAL THERAPY | Facility: CLINIC | Age: 65
End: 2020-07-16
Payer: COMMERCIAL

## 2020-07-16 DIAGNOSIS — M25.552 HIP PAIN, LEFT: ICD-10-CM

## 2020-07-16 PROCEDURE — 97140 MANUAL THERAPY 1/> REGIONS: CPT | Mod: GP | Performed by: PHYSICAL THERAPIST

## 2020-07-16 PROCEDURE — 97110 THERAPEUTIC EXERCISES: CPT | Mod: GP | Performed by: PHYSICAL THERAPIST

## 2020-07-23 ENCOUNTER — THERAPY VISIT (OUTPATIENT)
Dept: PHYSICAL THERAPY | Facility: CLINIC | Age: 65
End: 2020-07-23
Payer: COMMERCIAL

## 2020-07-23 DIAGNOSIS — M25.552 HIP PAIN, LEFT: ICD-10-CM

## 2020-07-23 PROCEDURE — 97112 NEUROMUSCULAR REEDUCATION: CPT | Mod: GP | Performed by: PHYSICAL THERAPIST

## 2020-07-23 PROCEDURE — 97110 THERAPEUTIC EXERCISES: CPT | Mod: GP | Performed by: PHYSICAL THERAPIST

## 2020-07-23 PROCEDURE — 97140 MANUAL THERAPY 1/> REGIONS: CPT | Mod: GP | Performed by: PHYSICAL THERAPIST

## 2020-08-20 ENCOUNTER — THERAPY VISIT (OUTPATIENT)
Dept: PHYSICAL THERAPY | Facility: CLINIC | Age: 65
End: 2020-08-20
Payer: COMMERCIAL

## 2020-08-20 DIAGNOSIS — M25.552 HIP PAIN, LEFT: ICD-10-CM

## 2020-08-20 PROCEDURE — 97112 NEUROMUSCULAR REEDUCATION: CPT | Mod: GP | Performed by: PHYSICAL THERAPIST

## 2020-08-20 PROCEDURE — 97110 THERAPEUTIC EXERCISES: CPT | Mod: GP | Performed by: PHYSICAL THERAPIST

## 2020-08-20 PROCEDURE — 97140 MANUAL THERAPY 1/> REGIONS: CPT | Mod: GP | Performed by: PHYSICAL THERAPIST

## 2020-09-10 ENCOUNTER — THERAPY VISIT (OUTPATIENT)
Dept: PHYSICAL THERAPY | Facility: CLINIC | Age: 65
End: 2020-09-10
Payer: COMMERCIAL

## 2020-09-10 DIAGNOSIS — M25.552 HIP PAIN, LEFT: ICD-10-CM

## 2020-09-10 PROCEDURE — 97112 NEUROMUSCULAR REEDUCATION: CPT | Mod: GP | Performed by: PHYSICAL THERAPIST

## 2020-09-10 PROCEDURE — 97110 THERAPEUTIC EXERCISES: CPT | Mod: GP | Performed by: PHYSICAL THERAPIST

## 2020-09-10 PROCEDURE — 97140 MANUAL THERAPY 1/> REGIONS: CPT | Mod: GP | Performed by: PHYSICAL THERAPIST

## 2020-09-16 ENCOUNTER — THERAPY VISIT (OUTPATIENT)
Dept: PHYSICAL THERAPY | Facility: CLINIC | Age: 65
End: 2020-09-16
Payer: COMMERCIAL

## 2020-09-16 DIAGNOSIS — M25.552 HIP PAIN, LEFT: ICD-10-CM

## 2020-09-16 DIAGNOSIS — M25.561 RIGHT KNEE PAIN: Primary | ICD-10-CM

## 2020-09-16 PROCEDURE — 97112 NEUROMUSCULAR REEDUCATION: CPT | Mod: GP | Performed by: PHYSICAL THERAPIST

## 2020-09-16 PROCEDURE — 97110 THERAPEUTIC EXERCISES: CPT | Mod: GP | Performed by: PHYSICAL THERAPIST

## 2020-09-16 PROCEDURE — 97140 MANUAL THERAPY 1/> REGIONS: CPT | Mod: GP | Performed by: PHYSICAL THERAPIST

## 2020-09-23 ENCOUNTER — THERAPY VISIT (OUTPATIENT)
Dept: PHYSICAL THERAPY | Facility: CLINIC | Age: 65
End: 2020-09-23
Payer: COMMERCIAL

## 2020-09-23 DIAGNOSIS — M25.552 HIP PAIN, LEFT: ICD-10-CM

## 2020-09-23 PROCEDURE — 97110 THERAPEUTIC EXERCISES: CPT | Mod: GP | Performed by: PHYSICAL THERAPIST

## 2020-09-23 PROCEDURE — 97112 NEUROMUSCULAR REEDUCATION: CPT | Mod: GP | Performed by: PHYSICAL THERAPIST

## 2020-09-23 PROCEDURE — 97140 MANUAL THERAPY 1/> REGIONS: CPT | Mod: GP | Performed by: PHYSICAL THERAPIST

## 2020-09-30 ENCOUNTER — THERAPY VISIT (OUTPATIENT)
Dept: PHYSICAL THERAPY | Facility: CLINIC | Age: 65
End: 2020-09-30
Payer: COMMERCIAL

## 2020-09-30 DIAGNOSIS — M25.552 HIP PAIN, LEFT: ICD-10-CM

## 2020-09-30 PROCEDURE — 97112 NEUROMUSCULAR REEDUCATION: CPT | Mod: GP | Performed by: PHYSICAL THERAPIST

## 2020-09-30 PROCEDURE — 97110 THERAPEUTIC EXERCISES: CPT | Mod: GP | Performed by: PHYSICAL THERAPIST

## 2020-09-30 PROCEDURE — 97140 MANUAL THERAPY 1/> REGIONS: CPT | Mod: GP | Performed by: PHYSICAL THERAPIST

## 2020-10-07 ENCOUNTER — THERAPY VISIT (OUTPATIENT)
Dept: PHYSICAL THERAPY | Facility: CLINIC | Age: 65
End: 2020-10-07
Payer: COMMERCIAL

## 2020-10-07 DIAGNOSIS — M25.552 HIP PAIN, LEFT: ICD-10-CM

## 2020-10-07 DIAGNOSIS — M25.561 RIGHT KNEE PAIN: Primary | ICD-10-CM

## 2020-10-07 PROCEDURE — 97110 THERAPEUTIC EXERCISES: CPT | Mod: GP | Performed by: PHYSICAL THERAPIST

## 2020-10-07 PROCEDURE — 97014 ELECTRIC STIMULATION THERAPY: CPT | Mod: GP | Performed by: PHYSICAL THERAPIST

## 2020-10-07 PROCEDURE — 97140 MANUAL THERAPY 1/> REGIONS: CPT | Mod: GP | Performed by: PHYSICAL THERAPIST

## 2020-10-14 ENCOUNTER — THERAPY VISIT (OUTPATIENT)
Dept: PHYSICAL THERAPY | Facility: CLINIC | Age: 65
End: 2020-10-14
Payer: COMMERCIAL

## 2020-10-14 DIAGNOSIS — M25.561 RIGHT KNEE PAIN: Primary | ICD-10-CM

## 2020-10-14 DIAGNOSIS — M25.552 HIP PAIN, LEFT: ICD-10-CM

## 2020-10-14 PROCEDURE — 97014 ELECTRIC STIMULATION THERAPY: CPT | Mod: GP | Performed by: PHYSICAL THERAPIST

## 2020-10-14 PROCEDURE — 97140 MANUAL THERAPY 1/> REGIONS: CPT | Mod: GP | Performed by: PHYSICAL THERAPIST

## 2020-10-14 PROCEDURE — 97110 THERAPEUTIC EXERCISES: CPT | Mod: GP | Performed by: PHYSICAL THERAPIST

## 2020-10-20 ENCOUNTER — THERAPY VISIT (OUTPATIENT)
Dept: PHYSICAL THERAPY | Facility: CLINIC | Age: 65
End: 2020-10-20
Payer: COMMERCIAL

## 2020-10-20 DIAGNOSIS — M25.552 HIP PAIN, LEFT: ICD-10-CM

## 2020-10-20 DIAGNOSIS — M25.561 RIGHT KNEE PAIN: Primary | ICD-10-CM

## 2020-10-20 PROCEDURE — 97110 THERAPEUTIC EXERCISES: CPT | Mod: GP | Performed by: PHYSICAL THERAPIST

## 2020-10-20 PROCEDURE — 97014 ELECTRIC STIMULATION THERAPY: CPT | Mod: GP | Performed by: PHYSICAL THERAPIST

## 2020-10-20 PROCEDURE — 97140 MANUAL THERAPY 1/> REGIONS: CPT | Mod: GP | Performed by: PHYSICAL THERAPIST

## 2020-10-20 NOTE — PROGRESS NOTES
Discharge Note    Progress reporting period is from initial eval/last PN to Oct 20, 2020.     Patient seen for   visits.    SUBJECTIVE  Subjective changes noted by patient:  Patient will be leaving to go out of state for the winter next week.  She will was advised to start with current walking plan then introduce specific progressive tennis activities saving the most provocative pivot on right foot shot until the other motions feel good.  She will email or call with questions.  .  Changes in function:  Yes (See Goal flowsheet attached for changes in current functional level)  Adverse reaction to treatment or activity: None    OBJECTIVE  Changes noted in objective findings: Mild tenderness proximal medial gastroc distal medial hamstring right gait with smooth even weight shift mild decreased pushoff improved with cues.     ASSESSMENT/PLAN  Diagnosis: Left hip pain   DIAGP:  The encounter diagnosis was Hip pain, left.  Updated problem list and treatment plan:   Decreased function - HEP  Edema - HEP  Impaired gait - HEP  STG/LTGs have been met or progress has been made towards goals:  Yes, please see goal flowsheet for most current information  Assessment of Progress: current status is unknown.    Last current status:     Self Management Plans:  HEP  I have re-evaluated this patient and find that the nature, scope, duration and intensity of the therapy is appropriate for the medical condition of the patient.  Cait continues to require the following intervention to meet STG and LTG's:  HEP.    Recommendations:  Discharge with current home program.  Patient to follow up with MD as needed.    Please refer to the daily flowsheet for treatment today, total treatment time and time spent performing 1:1 timed codes.

## 2021-01-15 ENCOUNTER — HEALTH MAINTENANCE LETTER (OUTPATIENT)
Age: 66
End: 2021-01-15

## 2021-05-09 ENCOUNTER — HEALTH MAINTENANCE LETTER (OUTPATIENT)
Age: 66
End: 2021-05-09

## 2021-07-16 ENCOUNTER — TRANSFERRED RECORDS (OUTPATIENT)
Dept: HEALTH INFORMATION MANAGEMENT | Facility: CLINIC | Age: 66
End: 2021-07-16

## 2021-07-28 ENCOUNTER — TRANSFERRED RECORDS (OUTPATIENT)
Dept: HEALTH INFORMATION MANAGEMENT | Facility: CLINIC | Age: 66
End: 2021-07-28

## 2021-08-02 ENCOUNTER — TRANSFERRED RECORDS (OUTPATIENT)
Dept: HEALTH INFORMATION MANAGEMENT | Facility: CLINIC | Age: 66
End: 2021-08-02

## 2021-08-05 ENCOUNTER — TRANSFERRED RECORDS (OUTPATIENT)
Dept: HEALTH INFORMATION MANAGEMENT | Facility: CLINIC | Age: 66
End: 2021-08-05

## 2021-08-05 DIAGNOSIS — D49.3 BREAST NEOPLASM: Primary | ICD-10-CM

## 2021-08-05 NOTE — TELEPHONE ENCOUNTER
Action    Action Taken 21  Spoke w/ pt re: [facility] KASIE. Pt advised she would stop by their office & fill one out ASAP.     -Spoke w/ Dee @ [facility] - they will push images & fax reports shortly    -Pt called back & advised they dropped the KASIE off @ the [place] location, Linda MIRANDA. Pt advised they had also just had a consultation w/ Dr. Munoz @ [facility]. Pt will fill out KASIE there as she is just across the street.    -Spoke w/ Lyubov @ [facility] - She advised me that it would more than likely be several days until Dr. Munoz was able to dictate Office Note     -Spoke w/ [facility] Hendrickson - they will fax Dr. Tristan notes and Path report shortly.    -Spoke w/ Khushi @ [facility], she advised they did not have KASIE yet.    Spoke alexis/ Linda MIRANDA @ [facility] [place]  - [phone], she advised she faxed over the request to [facility] [phone] number.  1:06 PM    Spoke alexis/ Khushi - KASIE received. She will fax recs shortly.  1:24 PM    Received call back from Khushi. Upon further review, pt put their  in the area indicating how long KASIE could be active for, rendering it invalid. Per Khushi, pt must re-sign KASIE. Khushi advised recs are ready to be sent once appropriate KASIE is received.    LVM for pt re: above.    High priority IB to clinic  1:57 PM       RECORDS STATUS - BREAST    RECORDS REQUESTED FROM: Ashanti Nicholas   DATE REQUESTED:    NOTES DETAILS STATUS   OFFICE NOTE from referring provider Received  Ashanti Hendrickson  [facility]   OFFICE NOTE from medical oncologist Received  [facility]   OFFICE NOTE from surgeon     OFFICE NOTE from radiation oncologist     DISCHARGE SUMMARY from hospital     DISCHARGE REPORT from the ER     OPERATIVE REPORT     MEDICATION LIST     CLINICAL TRIAL TREATMENTS TO DATE     LABS     PATHOLOGY REPORTS  (Tissue diagnosis, Stage, ER/MI percentage positive and intensity of staining, HER2 IHC, FISH, and all biopsies from breast and any distant metastasis)                 Report received, Slides requested    [facility] Trackin 21: [id number]   GENONOMIC TESTING     TYPE:   (Next Generation Sequencing, including Foundation One testing, and Oncotype score)     IMAGING (NEED IMAGES & REPORT)     CT SCANS PACS 21: [facility]   MRI     MAMMO     ULTRASOUND     PET PACS 21: [facility]   BONE SCAN     BRAIN MRI

## 2021-08-06 ENCOUNTER — LAB (OUTPATIENT)
Dept: LAB | Facility: CLINIC | Age: 66
End: 2021-08-06
Payer: COMMERCIAL

## 2021-08-06 ENCOUNTER — ONCOLOGY VISIT (OUTPATIENT)
Dept: ONCOLOGY | Facility: CLINIC | Age: 66
End: 2021-08-06
Attending: INTERNAL MEDICINE
Payer: COMMERCIAL

## 2021-08-06 ENCOUNTER — PRE VISIT (OUTPATIENT)
Dept: ONCOLOGY | Facility: CLINIC | Age: 66
End: 2021-08-06

## 2021-08-06 VITALS
DIASTOLIC BLOOD PRESSURE: 75 MMHG | SYSTOLIC BLOOD PRESSURE: 119 MMHG | HEIGHT: 63 IN | TEMPERATURE: 98.1 F | RESPIRATION RATE: 16 BRPM | OXYGEN SATURATION: 95 % | HEART RATE: 69 BPM | BODY MASS INDEX: 32.87 KG/M2 | WEIGHT: 185.5 LBS

## 2021-08-06 DIAGNOSIS — C50.919 METASTATIC BREAST CANCER: Primary | ICD-10-CM

## 2021-08-06 DIAGNOSIS — C50.919 METASTATIC BREAST CANCER: ICD-10-CM

## 2021-08-06 DIAGNOSIS — D49.3 BREAST NEOPLASM: ICD-10-CM

## 2021-08-06 LAB
ALBUMIN SERPL-MCNC: 4.3 G/DL (ref 3.4–5)
ALP SERPL-CCNC: 72 U/L (ref 40–150)
ALT SERPL W P-5'-P-CCNC: 36 U/L (ref 0–50)
ANION GAP SERPL CALCULATED.3IONS-SCNC: 7 MMOL/L (ref 3–14)
AST SERPL W P-5'-P-CCNC: 21 U/L (ref 0–45)
BASOPHILS # BLD AUTO: 0.1 10E3/UL (ref 0–0.2)
BASOPHILS NFR BLD AUTO: 1 %
BILIRUB SERPL-MCNC: 0.4 MG/DL (ref 0.2–1.3)
BUN SERPL-MCNC: 25 MG/DL (ref 7–30)
CALCIUM SERPL-MCNC: 9.4 MG/DL (ref 8.5–10.1)
CANCER AG27-29 SERPL-ACNC: 232 U/ML (ref 0–39)
CEA SERPL-MCNC: 3 UG/L (ref 0–2.5)
CHLORIDE BLD-SCNC: 112 MMOL/L (ref 94–109)
CO2 SERPL-SCNC: 24 MMOL/L (ref 20–32)
CREAT SERPL-MCNC: 0.95 MG/DL (ref 0.52–1.04)
EOSINOPHIL # BLD AUTO: 0.3 10E3/UL (ref 0–0.7)
EOSINOPHIL NFR BLD AUTO: 4 %
ERYTHROCYTE [DISTWIDTH] IN BLOOD BY AUTOMATED COUNT: 13.4 % (ref 10–15)
GFR SERPL CREATININE-BSD FRML MDRD: 63 ML/MIN/1.73M2
GLUCOSE BLD-MCNC: 95 MG/DL (ref 70–99)
HCT VFR BLD AUTO: 42.3 % (ref 35–47)
HGB BLD-MCNC: 14.2 G/DL (ref 11.7–15.7)
IMM GRANULOCYTES # BLD: 0 10E3/UL
IMM GRANULOCYTES NFR BLD: 0 %
LYMPHOCYTES # BLD AUTO: 2 10E3/UL (ref 0.8–5.3)
LYMPHOCYTES NFR BLD AUTO: 28 %
MCH RBC QN AUTO: 30.4 PG (ref 26.5–33)
MCHC RBC AUTO-ENTMCNC: 33.6 G/DL (ref 31.5–36.5)
MCV RBC AUTO: 91 FL (ref 78–100)
MONOCYTES # BLD AUTO: 0.8 10E3/UL (ref 0–1.3)
MONOCYTES NFR BLD AUTO: 11 %
NEUTROPHILS # BLD AUTO: 4.1 10E3/UL (ref 1.6–8.3)
NEUTROPHILS NFR BLD AUTO: 56 %
NRBC # BLD AUTO: 0 10E3/UL
NRBC BLD AUTO-RTO: 0 /100
PLATELET # BLD AUTO: 227 10E3/UL (ref 150–450)
POTASSIUM BLD-SCNC: 4.2 MMOL/L (ref 3.4–5.3)
PROT SERPL-MCNC: 7.2 G/DL (ref 6.8–8.8)
RBC # BLD AUTO: 4.67 10E6/UL (ref 3.8–5.2)
SODIUM SERPL-SCNC: 143 MMOL/L (ref 133–144)
WBC # BLD AUTO: 7.2 10E3/UL (ref 4–11)

## 2021-08-06 PROCEDURE — 82378 CARCINOEMBRYONIC ANTIGEN: CPT | Performed by: INTERNAL MEDICINE

## 2021-08-06 PROCEDURE — 85025 COMPLETE CBC W/AUTO DIFF WBC: CPT | Performed by: PATHOLOGY

## 2021-08-06 PROCEDURE — 80053 COMPREHEN METABOLIC PANEL: CPT | Performed by: PATHOLOGY

## 2021-08-06 PROCEDURE — G0463 HOSPITAL OUTPT CLINIC VISIT: HCPCS

## 2021-08-06 PROCEDURE — 99205 OFFICE O/P NEW HI 60 MIN: CPT | Mod: GC | Performed by: INTERNAL MEDICINE

## 2021-08-06 PROCEDURE — 36415 COLL VENOUS BLD VENIPUNCTURE: CPT | Performed by: PATHOLOGY

## 2021-08-06 PROCEDURE — 86300 IMMUNOASSAY TUMOR CA 15-3: CPT | Performed by: INTERNAL MEDICINE

## 2021-08-06 RX ORDER — PRAVASTATIN SODIUM 80 MG/1
80 TABLET ORAL EVERY EVENING
COMMUNITY

## 2021-08-06 RX ORDER — SERTRALINE HYDROCHLORIDE 100 MG/1
100 TABLET, FILM COATED ORAL EVERY MORNING
COMMUNITY

## 2021-08-06 RX ORDER — MULTIVIT WITH MINERALS/LUTEIN
1000 TABLET ORAL DAILY
COMMUNITY

## 2021-08-06 RX ORDER — EZETIMIBE 10 MG/1
10 TABLET ORAL DAILY
COMMUNITY
End: 2021-09-03

## 2021-08-06 ASSESSMENT — MIFFLIN-ST. JEOR: SCORE: 1355.55

## 2021-08-06 ASSESSMENT — PAIN SCALES - GENERAL: PAINLEVEL: NO PAIN (0)

## 2021-08-06 NOTE — LETTER
"    2021         RE: Cait Raza  [address] [facility] [address]        Dear Colleague,    Thank you for referring your patient, Cait Raza, to the [facility]. Please see a copy of my visit note below.        [facility] Oncology Consultation  [address] [place] [address]  Phone: [phone]    Outpatient Visit Note:    Patient: Cait Raza  MRN: [id number]  : 1955  ANTONY: Aug 6, 2021     Reason for Consultation:  Cait Raza is returns for evaluation and treatment of recurrent ER.      History: Cait Raza is a 66 year old patient with a new diagnosis of metastatic breast cancer.        Oncology History    1. History of right sided node negative, ER positive, KS positive breast cancer. Her initial tumor was 1.5 cm. She was on Zoladex from 2004 until 2005. She had menstrual cycles return in  and she was restarted on the Zoladex in 2007 until 2009. She was on tamoxifen from 2004 until 2009. She had been on no therapy since 2009.      On 2015 screening mammogram revealed right sided lesion that was biopsied and found to be invasive lobular, ER+, KS-negative, HER2-negative.  She decided to have bilateral mastectomy which was done by Dr. Mendoza at [facility] on 9/15/15.  The bilateral mastectomy revealed no residual tumor.  She had bilateral SNLBx which were both negative.  At [facility] she was staged at T1a (0.3 cm - presumably by our biopsy). N0, M0 breast cancer. At that time, we discussed the possibility of adjuvant endocrine therapy, but by my notes I stated that \"it's hard to get overly enthusiastic about any systemic adjuvant therapy\". I have not seen her again since that visit in 10-      She noted a skin lesion on her right chest, she thinks it was about at the same site of a surgical drain. She was seen by a dermatologist who performed a biopsy of the new lesion on right chest wall " revealed dermal involvement by invasive lobular carcinoma (6mm); ER+, NC-negative, HER2-negative (1+).  She has seen Dr. Pace and Andra for further evaluation. Further workup with PET imaging revealed FDG-avid sites in right scapula, thoracic spine, right iliac, bilateral axillary/subpectoral, and left supraclavicular/base of neck node. She has established care with Radiation Oncology. Genetic testing was previously negative.     She returns to clinic for further evaluation and treatment in relation to above. She is here with a friend. She last saw Dr. Lim in  and asked if she could re-establish care with me.     In the interval history, she reports she has been doing well. She was diagnosed with osteoporosis and underwent treatment with Reclast. She states that this therapy resulted in fatigue. She also underwent bilateral knee arthroplasty. She is overall asymptomatic at this time other than a decreased appetite and weight gain. She reports gaining about 20 pounds in the last two years. She continues to exercise daily by biking and staying active. She has been trying to eat a healthier diet.     ROS is otherwise negative. Specifically, she denies any bone pain at any of the sites noted to be involved on the PET/CT.      Medical history is notable for breast cancer, osteoarthritis, tachycardia.     Surgical history is reviewed in the EMR and is notable for bilateral total knee arthroplasty, bilateral mastectomy (2015).     Medications are reviewed in the EMR and notable for pravastatin, sertraline, metoprolol, ezetimibe, and multivitamins.     Family history is notable for prostate cancer in father who  at the age of 89 years old.     Social history is unremarkable as she does not smoke, drink alcohol, or use any other recreational drugs. She is a grandmother to a new 4-month-old and her other grandchild will be born in the next few months.       Physical Exam:  Vitals: B/P: 119/75, T: 98.1, P: 69, R:  16, Wt: 185 lbs 8 oz Body mass index is 32.86 kg/m .   Exam:   Gen: Appears well, no distress, pleasant, interactive  HEENT: no scleral icterus or hemorrhage, no lymphadenopathy  Chest: bilateral mastectomy, multiple firm, raised red lesions on right chest, biopsy site without evidence of drainage. 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 Except for the biopsy site, there is no evidence of skin erosion in any of these cutaneous sites.  CV: regular, no murmurs  Pulm: clear, no crackles or wheezing  Abd: soft, nontender, no splenomegaly  Ext: no LE edema  Neuro: no focal deficits, affect and cognition are normal; ambulating independently    Labs:  Pendnig    Imaging:  Reviewed with Dr. Lim. See media.     Problems:  1. Right sided 1.5 cm ER+ breast cancer in 2004. She was treated with OFS and tamoxifen for 5 years.  2. IBTR of right side with small (0.3cm) invasive lobular cancer. Had bilateral mastectomy and no additional tumor was identified. No additional systemic therapy was given.  3. New diagnosis of metastatic ER+ lobular breast cancer to skin, bones, and nodes.  4. S/p bilateral kneww replacement.    Assessment: Cait Raza is a 66 year old female with a history of recurrent right-sided ER+, FL-negative, HER2-negative breast cancer who presents for further evaluation and treatment.    It was a pleasure to meet Cait and her friend in clinic today. She is a previous patient of Dr. Lim who was diagnosed and treated for right-sided breast cancer in the past (see above). Prior to their last visit in 2015, she underwent a bilateral mastectomy and was recovering from her procedure. In the interval history, she was doing well until she noticed some lesions on her right chest. These were biopsied by Dermatology and found to be a recurrence of her breast cancer. PET imaging revealed metastasis to multiple areas including scapula, thoracic spine, right iliac, bilateral  axillary/subpectoral, and left supraclavicular/base of neck node.     We discussed the likely pathology of previously dormant malignant cells and their presumed evolution. For this cancer recurrence, our plan will be to initiate treatment with an aromatase inhibitor and subsequent CDK-4/6 inhibitor. She will require further evaluation prior to treatment. \ Dr. Lim plans to discuss her case at multidisciplinary tumor conference. All her questions were answered and she agreed with the plan.    We also spoke with Dr. Pace in radiation oncology at [facility]. There had been a plan to repeat a skin biopsy for more material, but I'm not sure she needs that given the review of the specimen at [facility]. I don't think we need to send this out for genomic sequencing at this point in time. Dr. Pace did not feel she needed chest wall radiation - especially if the lesions respond to systemic therapy.      Plan:    - Routine blood tests including CEA and breast tumor marker  - Pathology review of biopsy and comparison to initial breast cancer  - Dr. Lim to present case at tumor board  - Initiate treatment with aromatase inhibitor next week - anastrozole.  - Follow-up in 2 weeks  - Additional treatment with CDK-4/6 to be started later. We talked about the three available, and I would like to start her on abemaciclib. We discussed the side effects of diarrhea.       The patient was seen and discussed with Dr. Lim. Please see his addendum for more information.    Carmen Diaz MD  Internal Medicine, PGY-3  [facility]    I've seen and examined the patient with Dr. Diaz. I agree with her assessment and I have edited this document.    Eleazar Lim MD    Addendum:    Results for SHELBI NI (MRN [id number]) as of 8/9/2021 10:30   Ref. Range 8/6/2021 17:01   Sodium Latest Ref Range: 133 - 144 mmol/L 143   Potassium Latest Ref Range: 3.4 - 5.3 mmol/L 4.2   Chloride Latest Ref Range: 94 - 109 mmol/L 112 (H)   Carbon Dioxide  Latest Ref Range: 20 - 32 mmol/L 24   Urea Nitrogen Latest Ref Range: 7 - 30 mg/dL 25   Creatinine Latest Ref Range: 0.52 - 1.04 mg/dL 0.95   GFR Estimate Latest Ref Range: >60 mL/min/1.73m2 63   Calcium Latest Ref Range: 8.5 - 10.1 mg/dL 9.4   Anion Gap Latest Ref Range: 3 - 14 mmol/L 7   Albumin Latest Ref Range: 3.4 - 5.0 g/dL 4.3   Protein Total Latest Ref Range: 6.8 - 8.8 g/dL 7.2   Bilirubin Total Latest Ref Range: 0.2 - 1.3 mg/dL 0.4   Alkaline Phosphatase Latest Ref Range: 40 - 150 U/L 72   ALT Latest Ref Range: 0 - 50 U/L 36   AST Latest Ref Range: 0 - 45 U/L 21   CA 27-29 Latest Ref Range: 0 - 39 U/mL 232 (H)   Glucose Latest Ref Range: 70 - 99 mg/dL 95   CEA Latest Ref Range: 0.0 - 2.5 ug/L 3.0 (H)     Results for SHELBI NI (MRN [id number]) as of 8/9/2021 10:30   Ref. Range 8/6/2021 17:01   Sodium Latest Ref Range: 133 - 144 mmol/L 143   Potassium Latest Ref Range: 3.4 - 5.3 mmol/L 4.2   Chloride Latest Ref Range: 94 - 109 mmol/L 112 (H)   Carbon Dioxide Latest Ref Range: 20 - 32 mmol/L 24   Urea Nitrogen Latest Ref Range: 7 - 30 mg/dL 25   Creatinine Latest Ref Range: 0.52 - 1.04 mg/dL 0.95   GFR Estimate Latest Ref Range: >60 mL/min/1.73m2 63   Calcium Latest Ref Range: 8.5 - 10.1 mg/dL 9.4   Anion Gap Latest Ref Range: 3 - 14 mmol/L 7   Albumin Latest Ref Range: 3.4 - 5.0 g/dL 4.3   Protein Total Latest Ref Range: 6.8 - 8.8 g/dL 7.2   Bilirubin Total Latest Ref Range: 0.2 - 1.3 mg/dL 0.4   Alkaline Phosphatase Latest Ref Range: 40 - 150 U/L 72   ALT Latest Ref Range: 0 - 50 U/L 36   AST Latest Ref Range: 0 - 45 U/L 21   CA 27-29 Latest Ref Range: 0 - 39 U/mL 232 (H)   Glucose Latest Ref Range: 70 - 99 mg/dL 95   CEA Latest Ref Range: 0.0 - 2.5 ug/L 3.0 (H)     Results for SHELBI NI (MRN [id number]) as of 8/9/2021 10:30   Ref. Range 8/6/2021 17:00   WBC Latest Ref Range: 4.0 - 11.0 10e3/uL 7.2   Hemoglobin Latest Ref Range: 11.7 - 15.7 g/dL 14.2   Hematocrit Latest Ref Range: 35.0 - 47.0 %  42.3   Platelet Count Latest Ref Range: 150 - 450 10e3/uL 227   RBC Count Latest Ref Range: 3.80 - 5.20 10e6/uL 4.67   MCV Latest Ref Range: 78 - 100 fL 91   MCH Latest Ref Range: 26.5 - 33.0 pg 30.4   MCHC Latest Ref Range: 31.5 - 36.5 g/dL 33.6   RDW Latest Ref Range: 10.0 - 15.0 % 13.4   % Neutrophils Latest Units: % 56   % Lymphocytes Latest Units: % 28   % Monocytes Latest Units: % 11   % Eosinophils Latest Units: % 4   Absolute Basophils Latest Ref Range: 0.0 - 0.2 10e3/uL 0.1   % Basophils Latest Units: % 1   Absolute Eosinophils Latest Ref Range: 0.0 - 0.7 10e3/uL 0.3   Absolute Immature Granulocytes Latest Ref Range: <=0.0 10e3/uL 0.0   Absolute Lymphocytes Latest Ref Range: 0.8 - 5.3 10e3/uL 2.0   Absolute Monocytes Latest Ref Range: 0.0 - 1.3 10e3/uL 0.8   % Immature Granulocytes Latest Units: % 0   Absolute Neutrophils Latest Ref Range: 1.6 - 8.3 10e3/uL 4.1   Absolute NRBCs Latest Units: 10e3/uL 0.0   NRBCs per 100 WBC Latest Ref Range: <1 /100 0                 Again, thank you for allowing me to participate in the care of your patient.        Sincerely,        Eleazar Lim MD

## 2021-08-06 NOTE — PROGRESS NOTES
"    [facility] Oncology Consultation  [address] [place] [address]  Phone: [phone]    Outpatient Visit Note:    Patient: Cait Raza  MRN: [id number]  : 1955  ANTONY: Aug 6, 2021     Reason for Consultation:  Cait Raza is returns for evaluation and treatment of recurrent ER.      History: Cait Raza is a 66 year old patient with a new diagnosis of metastatic breast cancer.        Oncology History    1. History of right sided node negative, ER positive, LA positive breast cancer. Her initial tumor was 1.5 cm. She was on Zoladex from 2004 until 2005. She had menstrual cycles return in  and she was restarted on the Zoladex in 2007 until 2009. She was on tamoxifen from 2004 until 2009. She had been on no therapy since 2009.      On 2015 screening mammogram revealed right sided lesion that was biopsied and found to be invasive lobular, ER+, LA-negative, HER2-negative.  She decided to have bilateral mastectomy which was done by Dr. Mendoza at [facility] on 9/15/15.  The bilateral mastectomy revealed no residual tumor.  She had bilateral SNLBx which were both negative.  At [facility] she was staged at T1a (0.3 cm - presumably by our biopsy). N0, M0 breast cancer. At that time, we discussed the possibility of adjuvant endocrine therapy, but by my notes I stated that \"it's hard to get overly enthusiastic about any systemic adjuvant therapy\". I have not seen her again since that visit in 10-      She noted a skin lesion on her right chest, she thinks it was about at the same site of a surgical drain. She was seen by a dermatologist who performed a biopsy of the new lesion on right chest wall revealed dermal involvement by invasive lobular carcinoma (6mm); ER+, LA-negative, HER2-negative (1+).  She has seen Dr. James for further evaluation. Further workup with PET imaging revealed FDG-avid sites in right scapula, thoracic spine, right " iliac, bilateral axillary/subpectoral, and left supraclavicular/base of neck node. She has established care with Radiation Oncology. Genetic testing was previously negative.     She returns to clinic for further evaluation and treatment in relation to above. She is here with a friend. She last saw Dr. Lim in  and asked if she could re-establish care with me.     In the interval history, she reports she has been doing well. She was diagnosed with osteoporosis and underwent treatment with Reclast. She states that this therapy resulted in fatigue. She also underwent bilateral knee arthroplasty. She is overall asymptomatic at this time other than a decreased appetite and weight gain. She reports gaining about 20 pounds in the last two years. She continues to exercise daily by biking and staying active. She has been trying to eat a healthier diet.     ROS is otherwise negative. Specifically, she denies any bone pain at any of the sites noted to be involved on the PET/CT.      Medical history is notable for breast cancer, osteoarthritis, tachycardia.     Surgical history is reviewed in the EMR and is notable for bilateral total knee arthroplasty, bilateral mastectomy (2015).     Medications are reviewed in the EMR and notable for pravastatin, sertraline, metoprolol, ezetimibe, and multivitamins.     Family history is notable for prostate cancer in father who  at the age of 89 years old.     Social history is unremarkable as she does not smoke, drink alcohol, or use any other recreational drugs. She is a grandmother to a new 4-month-old and her other grandchild will be born in the next few months.       Physical Exam:  Vitals: B/P: 119/75, T: 98.1, P: 69, R: 16, Wt: 185 lbs 8 oz Body mass index is 32.86 kg/m .   Exam:   Gen: Appears well, no distress, pleasant, interactive  HEENT: no scleral icterus or hemorrhage, no lymphadenopathy  Chest: bilateral mastectomy, multiple firm, raised red lesions on right chest,  biopsy site without evidence of drainage. 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 Except for the biopsy site, there is no evidence of skin erosion in any of these cutaneous sites.  CV: regular, no murmurs  Pulm: clear, no crackles or wheezing  Abd: soft, nontender, no splenomegaly  Ext: no LE edema  Neuro: no focal deficits, affect and cognition are normal; ambulating independently    Labs:  Pendnig    Imaging:  Reviewed with Dr. Lim. See media.     Problems:  1. Right sided 1.5 cm ER+ breast cancer in 2004. She was treated with OFS and tamoxifen for 5 years.  2. IBTR of right side with small (0.3cm) invasive lobular cancer. Had bilateral mastectomy and no additional tumor was identified. No additional systemic therapy was given.  3. New diagnosis of metastatic ER+ lobular breast cancer to skin, bones, and nodes.  4. S/p bilateral kneww replacement.    Assessment: Cait Raza is a 66 year old female with a history of recurrent right-sided ER+, WI-negative, HER2-negative breast cancer who presents for further evaluation and treatment.    It was a pleasure to meet Cait and her friend in clinic today. She is a previous patient of Dr. Lim who was diagnosed and treated for right-sided breast cancer in the past (see above). Prior to their last visit in 2015, she underwent a bilateral mastectomy and was recovering from her procedure. In the interval history, she was doing well until she noticed some lesions on her right chest. These were biopsied by Dermatology and found to be a recurrence of her breast cancer. PET imaging revealed metastasis to multiple areas including scapula, thoracic spine, right iliac, bilateral axillary/subpectoral, and left supraclavicular/base of neck node.     We discussed the likely pathology of previously dormant malignant cells and their presumed evolution. For this cancer recurrence, our plan will be to initiate treatment with an aromatase inhibitor  and subsequent CDK-4/6 inhibitor. She will require further evaluation prior to treatment. \ Dr. Lim plans to discuss her case at multidisciplinary tumor conference. All her questions were answered and she agreed with the plan.    We also spoke with Dr. Pace in radiation oncology at [facility]. There had been a plan to repeat a skin biopsy for more material, but I'm not sure she needs that given the review of the specimen at [facility]. I don't think we need to send this out for genomic sequencing at this point in time. Dr. Pace did not feel she needed chest wall radiation - especially if the lesions respond to systemic therapy.      Plan:    - Routine blood tests including CEA and breast tumor marker  - Pathology review of biopsy and comparison to initial breast cancer  - Dr. Lim to present case at tumor board  - Initiate treatment with aromatase inhibitor next week - anastrozole.  - Follow-up in 2 weeks  - Additional treatment with CDK-4/6 to be started later. We talked about the three available, and I would like to start her on abemaciclib. We discussed the side effects of diarrhea.       The patient was seen and discussed with Dr. Lim. Please see his addendum for more information.    Carmen Diaz MD  Internal Medicine, PGY-3  [facility]    I've seen and examined the patient with Dr. Diaz. I agree with her assessment and I have edited this document.    Eleazar Lim MD    Addendum:    Results for SHELBI NI (MRN [id number]) as of 8/9/2021 10:30   Ref. Range 8/6/2021 17:01   Sodium Latest Ref Range: 133 - 144 mmol/L 143   Potassium Latest Ref Range: 3.4 - 5.3 mmol/L 4.2   Chloride Latest Ref Range: 94 - 109 mmol/L 112 (H)   Carbon Dioxide Latest Ref Range: 20 - 32 mmol/L 24   Urea Nitrogen Latest Ref Range: 7 - 30 mg/dL 25   Creatinine Latest Ref Range: 0.52 - 1.04 mg/dL 0.95   GFR Estimate Latest Ref Range: >60 mL/min/1.73m2 63   Calcium Latest Ref Range: 8.5 - 10.1 mg/dL 9.4   Anion Gap Latest Ref  Range: 3 - 14 mmol/L 7   Albumin Latest Ref Range: 3.4 - 5.0 g/dL 4.3   Protein Total Latest Ref Range: 6.8 - 8.8 g/dL 7.2   Bilirubin Total Latest Ref Range: 0.2 - 1.3 mg/dL 0.4   Alkaline Phosphatase Latest Ref Range: 40 - 150 U/L 72   ALT Latest Ref Range: 0 - 50 U/L 36   AST Latest Ref Range: 0 - 45 U/L 21   CA 27-29 Latest Ref Range: 0 - 39 U/mL 232 (H)   Glucose Latest Ref Range: 70 - 99 mg/dL 95   CEA Latest Ref Range: 0.0 - 2.5 ug/L 3.0 (H)     Results for SHELBI NI (MRN [id number]) as of 8/9/2021 10:30   Ref. Range 8/6/2021 17:01   Sodium Latest Ref Range: 133 - 144 mmol/L 143   Potassium Latest Ref Range: 3.4 - 5.3 mmol/L 4.2   Chloride Latest Ref Range: 94 - 109 mmol/L 112 (H)   Carbon Dioxide Latest Ref Range: 20 - 32 mmol/L 24   Urea Nitrogen Latest Ref Range: 7 - 30 mg/dL 25   Creatinine Latest Ref Range: 0.52 - 1.04 mg/dL 0.95   GFR Estimate Latest Ref Range: >60 mL/min/1.73m2 63   Calcium Latest Ref Range: 8.5 - 10.1 mg/dL 9.4   Anion Gap Latest Ref Range: 3 - 14 mmol/L 7   Albumin Latest Ref Range: 3.4 - 5.0 g/dL 4.3   Protein Total Latest Ref Range: 6.8 - 8.8 g/dL 7.2   Bilirubin Total Latest Ref Range: 0.2 - 1.3 mg/dL 0.4   Alkaline Phosphatase Latest Ref Range: 40 - 150 U/L 72   ALT Latest Ref Range: 0 - 50 U/L 36   AST Latest Ref Range: 0 - 45 U/L 21   CA 27-29 Latest Ref Range: 0 - 39 U/mL 232 (H)   Glucose Latest Ref Range: 70 - 99 mg/dL 95   CEA Latest Ref Range: 0.0 - 2.5 ug/L 3.0 (H)     Results for SHELBI NI (MRN [id number]) as of 8/9/2021 10:30   Ref. Range 8/6/2021 17:00   WBC Latest Ref Range: 4.0 - 11.0 10e3/uL 7.2   Hemoglobin Latest Ref Range: 11.7 - 15.7 g/dL 14.2   Hematocrit Latest Ref Range: 35.0 - 47.0 % 42.3   Platelet Count Latest Ref Range: 150 - 450 10e3/uL 227   RBC Count Latest Ref Range: 3.80 - 5.20 10e6/uL 4.67   MCV Latest Ref Range: 78 - 100 fL 91   MCH Latest Ref Range: 26.5 - 33.0 pg 30.4   MCHC Latest Ref Range: 31.5 - 36.5 g/dL 33.6   RDW Latest Ref  Range: 10.0 - 15.0 % 13.4   % Neutrophils Latest Units: % 56   % Lymphocytes Latest Units: % 28   % Monocytes Latest Units: % 11   % Eosinophils Latest Units: % 4   Absolute Basophils Latest Ref Range: 0.0 - 0.2 10e3/uL 0.1   % Basophils Latest Units: % 1   Absolute Eosinophils Latest Ref Range: 0.0 - 0.7 10e3/uL 0.3   Absolute Immature Granulocytes Latest Ref Range: <=0.0 10e3/uL 0.0   Absolute Lymphocytes Latest Ref Range: 0.8 - 5.3 10e3/uL 2.0   Absolute Monocytes Latest Ref Range: 0.0 - 1.3 10e3/uL 0.8   % Immature Granulocytes Latest Units: % 0   Absolute Neutrophils Latest Ref Range: 1.6 - 8.3 10e3/uL 4.1   Absolute NRBCs Latest Units: 10e3/uL 0.0   NRBCs per 100 WBC Latest Ref Range: <1 /100 0

## 2021-08-06 NOTE — NURSING NOTE
"Oncology Rooming Note    August 6, 2021 3:30 PM   Cait Raza is a 66 year old female who presents for:    Chief Complaint   Patient presents with     Oncology Clinic Visit     Breast neoplasm     Initial Vitals: /75   Pulse 69   Temp 98.1  F (36.7  C)   Resp 16   Ht 1.6 m (5' 3\")   Wt 84.1 kg (185 lb 8 oz)   LMP 07/26/2004   SpO2 95%   BMI 32.86 kg/m   Estimated body mass index is 32.86 kg/m  as calculated from the following:    Height as of this encounter: 1.6 m (5' 3\").    Weight as of this encounter: 84.1 kg (185 lb 8 oz). Body surface area is 1.93 meters squared.  No Pain (0) Comment: Data Unavailable   Patient's last menstrual period was 07/26/2004.  Allergies reviewed: Yes  Medications reviewed: Yes    Medications: Medication refills not needed today.  Pharmacy name entered into LYCE[facility]:    [facility] - [place], [place] - [address] [id number] [address]    Clinical concerns: New patient        Justin Tomlinson MA            "

## 2021-08-09 ENCOUNTER — PATIENT OUTREACH (OUTPATIENT)
Dept: ONCOLOGY | Facility: CLINIC | Age: 66
End: 2021-08-09

## 2021-08-09 NOTE — PROGRESS NOTES
Patient returned call and gave patient telephone numbers to reach writer and Nurse Triage with any symptoms from treatment with breast cancer. Instructed to use [employer] message, but not with symptoms. Gave information for support with [employer], [employer], [employer] and [employer]. Offered counseling through Palliative Care that patient will let writer know if she wishes to pursue and Dr Saúl Raphael for her .  Answered all patient's questions and verbalized understanding. Georgette Arrieta RN, BSN.

## 2021-08-12 ENCOUNTER — TELEPHONE (OUTPATIENT)
Dept: ONCOLOGY | Facility: CLINIC | Age: 66
End: 2021-08-12

## 2021-08-12 ENCOUNTER — PATIENT OUTREACH (OUTPATIENT)
Dept: ONCOLOGY | Facility: CLINIC | Age: 66
End: 2021-08-12

## 2021-08-12 NOTE — TELEPHONE ENCOUNTER
PRIOR AUTHORIZATION DENIED    Medication: Verzenio PA denial    Denial Date: 8/12/2021    Denial Rational: must try and fail Ibrance    Appeal Information: message to team to see if Cait has been on Ibrance, I did not see any history, Can appeal with documentation.

## 2021-08-12 NOTE — PROGRESS NOTES
Returned call to patient with questions after discussion with our tumor board for her treatment and review of Pathology. Offered an appointment 8/13/2021 with Dr Lim at 10:30 to discuss Dr Lim's recommendations. Georgette Arrieta RN, BSN  [facility] Nurse Coordinator

## 2021-08-13 NOTE — TELEPHONE ENCOUNTER
Action 08/13/2021   Action Taken bx received from [facility] taken to 5th floor to process  ck

## 2021-08-16 NOTE — TELEPHONE ENCOUNTER
[employer] approved verzenio appeal.   Approval dates: 5/17/2021 - 8/15/2022  High copay $2,843.06. will look into ramon, copay card, assistance.

## 2021-08-17 ENCOUNTER — LAB (OUTPATIENT)
Dept: LAB | Facility: CLINIC | Age: 66
End: 2021-08-17
Payer: COMMERCIAL

## 2021-08-17 ENCOUNTER — TELEPHONE (OUTPATIENT)
Dept: ONCOLOGY | Facility: CLINIC | Age: 66
End: 2021-08-17

## 2021-08-17 DIAGNOSIS — C50.919 METASTATIC BREAST CANCER: Primary | ICD-10-CM

## 2021-08-17 DIAGNOSIS — C50.919 BREAST CANCER (H): Primary | ICD-10-CM

## 2021-08-17 DIAGNOSIS — Z79.899 ENCOUNTER FOR LONG-TERM CURRENT USE OF MEDICATION: ICD-10-CM

## 2021-08-17 PROCEDURE — 88342 IMHCHEM/IMCYTCHM 1ST ANTB: CPT | Mod: TC

## 2021-08-17 PROCEDURE — 88377 M/PHMTRC ALYS ISHQUANT/SEMIQ: CPT

## 2021-08-17 PROCEDURE — 88377 M/PHMTRC ALYS ISHQUANT/SEMIQ: CPT | Mod: 26 | Performed by: MEDICAL GENETICS

## 2021-08-17 RX ORDER — PROCHLORPERAZINE MALEATE 10 MG
10 TABLET ORAL EVERY 6 HOURS PRN
Qty: 30 TABLET | Refills: 2 | Status: SHIPPED | OUTPATIENT
Start: 2021-08-17

## 2021-08-17 RX ORDER — ANASTROZOLE 1 MG/1
1 TABLET ORAL DAILY
Qty: 28 TABLET | Refills: 0 | Status: SHIPPED | OUTPATIENT
Start: 2021-08-17 | End: 2021-09-09

## 2021-08-17 NOTE — ORAL ONC MGMT
"Oral Chemotherapy Monitoring Program    Lab Monitoring Plan  CBC with Diff and CMP every 2 weeks for first 2 months and then monthly  Labs drawn outside of [facility]: Patient stated will get labs drawn at [facility].  Subjective/Objective:  Cait Raza is a 66 year old female contacted by phone for an initial visit for oral chemotherapy education.      ORAL CHEMOTHERAPY 8/12/2021 8/17/2021   Assessment Type Other New Teach   Diagnosis Code Breast Cancer Breast Cancer   Providers Dr. Markell Guillaume   [facility]   Drug Name Verzenio (abemaciclib) Verzenio (abemaciclib)   Dose 150 mg 150 mg   Current Schedule BID BID   Cycle Details Continuous Continuous   Is the dose as ordered appropriate for the patient? - Yes       Last PHQ-2 Score on record:   PHQ-2 ( 1999 [employer]) 8/14/2015 7/21/2015   Q1: Little interest or pleasure in doing things 0 0   Q2: Feeling down, depressed or hopeless 0 0   PHQ-2 Score 0 0       Vitals:  BP:   BP Readings from Last 1 Encounters:   08/06/21 119/75     Wt Readings from Last 1 Encounters:   08/06/21 84.1 kg (185 lb 8 oz)     Estimated body surface area is 1.93 meters squared as calculated from the following:    Height as of 8/6/21: 1.6 m (5' 3\").    Weight as of 8/6/21: 84.1 kg (185 lb 8 oz).    Labs:  _  Result Component Current Result Ref Range   Sodium 143 (8/6/2021) 133 - 144 mmol/L     _  Result Component Current Result Ref Range   Potassium 4.2 (8/6/2021) 3.4 - 5.3 mmol/L     _  Result Component Current Result Ref Range   Calcium 9.4 (8/6/2021) 8.5 - 10.1 mg/dL     No results found for Mag within last 30 days.     No results found for Phos within last 30 days.     _  Result Component Current Result Ref Range   Albumin 4.3 (8/6/2021) 3.4 - 5.0 g/dL     _  Result Component Current Result Ref Range   Urea Nitrogen 25 (8/6/2021) 7 - 30 mg/dL     _  Result Component Current Result Ref Range   Creatinine 0.95 (8/6/2021) 0.52 - 1.04 mg/dL     _  Result Component Current " Result Ref Range   AST 21 (8/6/2021) 0 - 45 U/L     _  Result Component Current Result Ref Range   ALT 36 (8/6/2021) 0 - 50 U/L     _  Result Component Current Result Ref Range   Bilirubin Total 0.4 (8/6/2021) 0.2 - 1.3 mg/dL     _  Result Component Current Result Ref Range   WBC Count 7.2 (8/6/2021) 4.0 - 11.0 10e3/uL     _  Result Component Current Result Ref Range   Hemoglobin 14.2 (8/6/2021) 11.7 - 15.7 g/dL     _  Result Component Current Result Ref Range   Platelet Count 227 (8/6/2021) 150 - 450 10e3/uL     No results found for ANC within last 30 days.       Assessment:  Patient is appropriate to start therapy.    Plan:  Basic chemotherapy teaching was reviewed with the patient including indication, start date of therapy, dose, administration, adverse effects, missed doses, food and drug interactions, monitoring, side effect management, office contact information, and safe handling. Written materials were provided and all questions answered.    Follow-Up:  We will follow-up with Cait one week after starting Verzenio.     Michelle Mahoney, Pharm.D., [facility]  [phone]  08/17/21

## 2021-08-18 ENCOUNTER — PATIENT OUTREACH (OUTPATIENT)
Dept: ONCOLOGY | Facility: CLINIC | Age: 66
End: 2021-08-18

## 2021-08-18 ENCOUNTER — TELEPHONE (OUTPATIENT)
Dept: ONCOLOGY | Facility: CLINIC | Age: 66
End: 2021-08-18

## 2021-08-18 NOTE — PROGRESS NOTES
Left message on patient's home telephone with reaching out to the chemotherapy pharmacists with starting Verzenio and Arimidex. Oral Chemotherapy Pharmacists will get clarified and contact the patient with when to start both medications. Georgette Arrieta RN, BSN  [facility] Nurse Coordinator

## 2021-08-18 NOTE — CONFIDENTIAL NOTE
Oral Chemotherapy Monitoring Program     Placed call to patient in follow up of oral chemotherapy. There was confusion on if she should start Arimidex and wait to start Verzenio until she meets with Millie on 9/03. Per Millie, OK to start both medications  OR  Patient could wait to start Verzenio until 9/03 appt. I presented these options to Cait, she would like to start both medications. She will start them tomorrow, 8/19/21.    Follow-up:   8/26/21: initial assessment    Mee Isaac, PharmD  Hematology/Oncology Clinical Pharmacist  [facility]  [facility]

## 2021-08-25 LAB
PATH REPORT.COMMENTS IMP SPEC: NORMAL
PATH REPORT.COMMENTS IMP SPEC: NORMAL
PATH REPORT.FINAL DX SPEC: NORMAL
PATH REPORT.GROSS SPEC: NORMAL
PATH REPORT.MICROSCOPIC SPEC OTHER STN: NORMAL
PATH REPORT.RELEVANT HX SPEC: NORMAL
PATH REPORT.RELEVANT HX SPEC: NORMAL
PATH REPORT.SITE OF ORIGIN SPEC: NORMAL
PATHOLOGY SYNOPTIC REPORT: NORMAL

## 2021-08-25 PROCEDURE — 88323 CONSLTJ&REPRT MATRL PREP SLD: CPT | Mod: 26 | Performed by: PATHOLOGY

## 2021-08-26 ENCOUNTER — TELEPHONE (OUTPATIENT)
Dept: ONCOLOGY | Facility: CLINIC | Age: 66
End: 2021-08-26

## 2021-08-26 NOTE — TELEPHONE ENCOUNTER
Oral Chemotherapy Monitoring Program     Placed call to Cait Raza in follow up of Verzenio oral chemotherapy.     Left a message requesting a call back. No drug names were mentioned in the voicemail. Will update when response is received.      Erica Ortiz, PharmD  Oral Chemotherapy Monitoring Program  [facility]  [phone]  August 26, 2021

## 2021-08-27 ENCOUNTER — TELEPHONE (OUTPATIENT)
Dept: ONCOLOGY | Facility: CLINIC | Age: 66
End: 2021-08-27

## 2021-08-27 NOTE — TELEPHONE ENCOUNTER
Oral Chemotherapy Monitoring Program    Subjective/Objective:  Cait Raza is a 66 year old female contacted by phone for a follow-up visit for oral chemotherapy. She details that she started therapy on 8/19/21 and has been adherent to 150mg twice daily. She reports that since starting therapy she has noticed that her stools are looser, but that it is only occurring 1x/day. Additionally, she shares that she noticed her appetite is less, but she is ensuring that she is getting enough protein each day.     ORAL CHEMOTHERAPY 8/12/2021 8/17/2021 8/18/2021 8/26/2021 8/27/2021   Assessment Type Other New Teach Other Left Voicemail Initial Follow up   Diagnosis Code Breast Cancer Breast Cancer Breast Cancer Breast Cancer Breast Cancer   Providers Dr. Markell Lim   [facility]   Drug Name Verzenio (abemaciclib) Verzenio (abemaciclib) Verzenio (abemaciclib) Verzenio (abemaciclib) Verzenio (abemaciclib)   Dose 150 mg 150 mg 150 mg 150 mg 150 mg   Current Schedule BID BID BID BID BID   Cycle Details Continuous Continuous Continuous Continuous Continuous   Start Date of Last Cycle - - - - 8/19/2021   Doses missed in last 2 weeks - - - - 0   Adherence Assessment - - - - Adherent   Adverse Effects - - - - No AE identified during assessment   Any new drug interactions? - - - - No   Is the dose as ordered appropriate for the patient? - Yes - - Yes   Is the patient currently in pain? - - - - No   Has the patient been assessed within the past 6 months for depression? - - - - No   Has the patient missed any days of school, work, or other routine activity? - - - - No   Since the last time we talked, have you been hospitalized or used the emergency room? - - - - No       Last PHQ-2 Score on record:   PHQ-2 ( 1999 [facility]) 8/14/2015 7/21/2015   Q1: Little interest or pleasure in doing things 0 0   Q2: Feeling down, depressed or hopeless 0 0   PHQ-2 Score 0 0  "      Vitals:  BP:   BP Readings from Last 1 Encounters:   08/06/21 119/75     Wt Readings from Last 1 Encounters:   08/06/21 84.1 kg (185 lb 8 oz)     Estimated body surface area is 1.93 meters squared as calculated from the following:    Height as of 8/6/21: 1.6 m (5' 3\").    Weight as of 8/6/21: 84.1 kg (185 lb 8 oz).    Labs:  _  Result Component Current Result Ref Range   Sodium 143 (8/6/2021) 133 - 144 mmol/L     _  Result Component Current Result Ref Range   Potassium 4.2 (8/6/2021) 3.4 - 5.3 mmol/L     _  Result Component Current Result Ref Range   Calcium 9.4 (8/6/2021) 8.5 - 10.1 mg/dL     No results found for Mag within last 30 days.     No results found for Phos within last 30 days.     _  Result Component Current Result Ref Range   Albumin 4.3 (8/6/2021) 3.4 - 5.0 g/dL     _  Result Component Current Result Ref Range   Urea Nitrogen 25 (8/6/2021) 7 - 30 mg/dL     _  Result Component Current Result Ref Range   Creatinine 0.95 (8/6/2021) 0.52 - 1.04 mg/dL     _  Result Component Current Result Ref Range   AST 21 (8/6/2021) 0 - 45 U/L     _  Result Component Current Result Ref Range   ALT 36 (8/6/2021) 0 - 50 U/L     _  Result Component Current Result Ref Range   Bilirubin Total 0.4 (8/6/2021) 0.2 - 1.3 mg/dL     _  Result Component Current Result Ref Range   WBC Count 7.2 (8/6/2021) 4.0 - 11.0 10e3/uL     _  Result Component Current Result Ref Range   Hemoglobin 14.2 (8/6/2021) 11.7 - 15.7 g/dL     _  Result Component Current Result Ref Range   Platelet Count 227 (8/6/2021) 150 - 450 10e3/uL     No results found for ANC within last 30 days.         Assessment/Plan:  Cait is tolerating the start of therapy well. Continue Verzenio therapy as planned. Message sent to scheduling to arrange labs on 9/3.     Follow-Up:  9/3: labs + appt with Millie    Refill Due:  9/9 for 9/16      Echo HagenD, BCACP  Oral Chemotherapy Monitoring Program  [facility]  [phone]   "

## 2021-08-31 LAB — INTERPRETATION: NORMAL

## 2021-09-03 ENCOUNTER — APPOINTMENT (OUTPATIENT)
Dept: LAB | Facility: CLINIC | Age: 66
End: 2021-09-03
Attending: PHYSICIAN ASSISTANT
Payer: COMMERCIAL

## 2021-09-03 ENCOUNTER — ONCOLOGY VISIT (OUTPATIENT)
Dept: ONCOLOGY | Facility: CLINIC | Age: 66
End: 2021-09-03
Attending: INTERNAL MEDICINE
Payer: COMMERCIAL

## 2021-09-03 VITALS
BODY MASS INDEX: 32.69 KG/M2 | RESPIRATION RATE: 16 BRPM | HEART RATE: 68 BPM | SYSTOLIC BLOOD PRESSURE: 133 MMHG | HEIGHT: 63 IN | WEIGHT: 184.5 LBS | OXYGEN SATURATION: 97 % | DIASTOLIC BLOOD PRESSURE: 73 MMHG | TEMPERATURE: 98.1 F

## 2021-09-03 DIAGNOSIS — C79.51 BONE METASTASIS: ICD-10-CM

## 2021-09-03 DIAGNOSIS — C50.919 METASTATIC BREAST CANCER: Primary | ICD-10-CM

## 2021-09-03 LAB
ALBUMIN SERPL-MCNC: 3.7 G/DL (ref 3.4–5)
ALP SERPL-CCNC: 81 U/L (ref 40–150)
ALT SERPL W P-5'-P-CCNC: 30 U/L (ref 0–50)
ANION GAP SERPL CALCULATED.3IONS-SCNC: 5 MMOL/L (ref 3–14)
AST SERPL W P-5'-P-CCNC: 16 U/L (ref 0–45)
BASOPHILS # BLD AUTO: 0.1 10E3/UL (ref 0–0.2)
BASOPHILS NFR BLD AUTO: 1 %
BILIRUB SERPL-MCNC: 0.5 MG/DL (ref 0.2–1.3)
BUN SERPL-MCNC: 19 MG/DL (ref 7–30)
CALCIUM SERPL-MCNC: 9.2 MG/DL (ref 8.5–10.1)
CHLORIDE BLD-SCNC: 113 MMOL/L (ref 94–109)
CO2 SERPL-SCNC: 23 MMOL/L (ref 20–32)
CREAT SERPL-MCNC: 1.16 MG/DL (ref 0.52–1.04)
EOSINOPHIL # BLD AUTO: 0.2 10E3/UL (ref 0–0.7)
EOSINOPHIL NFR BLD AUTO: 4 %
ERYTHROCYTE [DISTWIDTH] IN BLOOD BY AUTOMATED COUNT: 13.6 % (ref 10–15)
GFR SERPL CREATININE-BSD FRML MDRD: 50 ML/MIN/1.73M2
GLUCOSE BLD-MCNC: 101 MG/DL (ref 70–99)
HCT VFR BLD AUTO: 42.5 % (ref 35–47)
HGB BLD-MCNC: 14.1 G/DL (ref 11.7–15.7)
IMM GRANULOCYTES # BLD: 0 10E3/UL
IMM GRANULOCYTES NFR BLD: 0 %
LYMPHOCYTES # BLD AUTO: 1.7 10E3/UL (ref 0.8–5.3)
LYMPHOCYTES NFR BLD AUTO: 37 %
MCH RBC QN AUTO: 30.3 PG (ref 26.5–33)
MCHC RBC AUTO-ENTMCNC: 33.2 G/DL (ref 31.5–36.5)
MCV RBC AUTO: 91 FL (ref 78–100)
MONOCYTES # BLD AUTO: 0.3 10E3/UL (ref 0–1.3)
MONOCYTES NFR BLD AUTO: 7 %
NEUTROPHILS # BLD AUTO: 2.3 10E3/UL (ref 1.6–8.3)
NEUTROPHILS NFR BLD AUTO: 51 %
NRBC # BLD AUTO: 0 10E3/UL
NRBC BLD AUTO-RTO: 0 /100
PLATELET # BLD AUTO: 196 10E3/UL (ref 150–450)
POTASSIUM BLD-SCNC: 4 MMOL/L (ref 3.4–5.3)
PROT SERPL-MCNC: 6.8 G/DL (ref 6.8–8.8)
RBC # BLD AUTO: 4.65 10E6/UL (ref 3.8–5.2)
SODIUM SERPL-SCNC: 141 MMOL/L (ref 133–144)
WBC # BLD AUTO: 4.5 10E3/UL (ref 4–11)

## 2021-09-03 PROCEDURE — 99214 OFFICE O/P EST MOD 30 MIN: CPT | Performed by: PHYSICIAN ASSISTANT

## 2021-09-03 PROCEDURE — 85025 COMPLETE CBC W/AUTO DIFF WBC: CPT | Performed by: PHYSICIAN ASSISTANT

## 2021-09-03 PROCEDURE — G0463 HOSPITAL OUTPT CLINIC VISIT: HCPCS

## 2021-09-03 PROCEDURE — 36415 COLL VENOUS BLD VENIPUNCTURE: CPT | Performed by: PHYSICIAN ASSISTANT

## 2021-09-03 PROCEDURE — 82040 ASSAY OF SERUM ALBUMIN: CPT | Performed by: PHYSICIAN ASSISTANT

## 2021-09-03 RX ORDER — ONDANSETRON 4 MG/1
4 TABLET, FILM COATED ORAL EVERY 8 HOURS PRN
Qty: 20 TABLET | Refills: 3 | Status: SHIPPED | OUTPATIENT
Start: 2021-09-03

## 2021-09-03 ASSESSMENT — PAIN SCALES - GENERAL: PAINLEVEL: NO PAIN (0)

## 2021-09-03 ASSESSMENT — MIFFLIN-ST. JEOR: SCORE: 1351.02

## 2021-09-03 NOTE — NURSING NOTE
Chief Complaint   Patient presents with     Blood Draw     Labs drawn via  by RN in lab. VS taken.      Labs drawn via venipuncture. Vital signs taken. Checked into next appointment.   Anna Valentine RN

## 2021-09-03 NOTE — NURSING NOTE
"Oncology Rooming Note    September 3, 2021 2:26 PM   Cait Raza is a 66 year old female who presents for:    Chief Complaint   Patient presents with     Blood Draw     Labs drawn via  by RN in lab. VS taken.      Oncology Clinic Visit     Return: Metastatic breast cancer      Initial Vitals: /73   Pulse 68   Temp 98.1  F (36.7  C) (Oral)   Resp 16   Ht 1.6 m (5' 3\")   Wt 83.7 kg (184 lb 8 oz)   LMP 07/26/2004   SpO2 97%   BMI 32.68 kg/m   Estimated body mass index is 32.68 kg/m  as calculated from the following:    Height as of this encounter: 1.6 m (5' 3\").    Weight as of this encounter: 83.7 kg (184 lb 8 oz). Body surface area is 1.93 meters squared.  No Pain (0) Comment: Data Unavailable   Patient's last menstrual period was 07/26/2004.  Allergies reviewed: Yes  Medications reviewed: Yes    Medications: Medication refills not needed today.  Pharmacy name entered into [facility]:    [facility] - [place], [place] - [address] [id number] [place], [place] - [address] [facility] MAIL/SPECIALTY PHARMACY - [place], [place] - [address] [place] [address] ISABELLA SAL    Clinical concerns: N/A     Pam Rae CMA              "

## 2021-09-03 NOTE — PROGRESS NOTES
"    [facility] [address] [address]  Phone: [phone]          Reason for Consultation:  Cait Raza is returns for evaluation and treatment of recurrent ER.      History: Cait Raza is a 66 year old patient with a new diagnosis of metastatic breast cancer.        Oncology History    1. History of right sided node negative, ER positive, AL positive breast cancer. Her initial tumor was 1.5 cm. She was on Zoladex from 11/2004 until 11/2005. She had menstrual cycles return in 2007 and she was restarted on the Zoladex in 03/2007 until 04/2009. She was on tamoxifen from 12/2004 until 11/2009. She had been on no therapy since November 2009.     2/ On 7/21/2015 screening mammogram revealed right sided lesion that was biopsied and found to be invasive lobular, ER+, AL-negative, HER2-negative.  She decided to have bilateral mastectomy which was done by Dr. Mendoza at [facility] on 9/15/15.  The bilateral mastectomy revealed no residual tumor.  She had bilateral SNLBx which were both negative.  At [facility] she was staged at T1a (0.3 cm - presumably by our biopsy). N0, M0 breast cancer. At that time, we discussed the possibility of adjuvant endocrine therapy, but by my notes I stated that \"it's hard to get overly enthusiastic about any systemic adjuvant therapy\". I have not seen her again since that visit in 10-      She noted a skin lesion on her right chest, she thinks it was about at the same site of a surgical drain. She was seen by a dermatologist who performed a biopsy of the new lesion on right chest wall revealed dermal involvement by invasive lobular carcinoma (6mm); ER+, AL-negative, HER2-negative (1+).  She has seen Dr. Pace and Andra for further evaluation. Further workup with PET imaging revealed FDG-avid sites in right scapula, thoracic spine, right iliac, bilateral axillary/subpectoral, and left supraclavicular/base of neck node. She has established care with " "Radiation Oncology. Genetic testing was previously negative.     She returns to clinic for further evaluation and treatment in relation to above. She is here with a friend. She last saw Dr. Lim in 2015 and recently reestablished care with him and started on Verzenio.     INTERVAL HISTORY:  Cait is only 2 weeks into her Verzenio.  She has had some mild nausea and some mild diarrhea (2-3 a day) thus far.  Her energy levels are fair, she is very active daily tennis, walking and babysitting for her 5 month old grandson.  Eating healthy and keeping her mood positive.  No bone pains.  No change to the skin lesions on her chest.       Surgical history is reviewed in the EMR and is notable for bilateral total knee arthroplasty, bilateral mastectomy (2015).     Medications are reviewed in the EMR and notable for pravastatin, sertraline, metoprolol, ezetimibe, and multivitamins.       Physical Exam:  /73   Pulse 68   Temp 98.1  F (36.7  C) (Oral)   Resp 16   Ht 1.6 m (5' 3\")   Wt 83.7 kg (184 lb 8 oz)   LMP 07/26/2004   SpO2 97%   BMI 32.68 kg/m    Wt Readings from Last 4 Encounters:   09/03/21 83.7 kg (184 lb 8 oz)   08/06/21 84.1 kg (185 lb 8 oz)   10/09/15 80.8 kg (178 lb 1.6 oz)   08/28/15 79.3 kg (174 lb 14.4 oz)       Exam:   Gen: Appears well, no distress, pleasant, interactive  HEENT: no scleral icterus or hemorrhage, no lymphadenopathy  Chest: bilateral mastectomy, multiple firm, raised red lesions on right chest, biopsy site without evidence of drainage. 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 Except for the biopsy site, there is no evidence of skin erosion in any of these cutaneous sites.  CV: regular, no murmurs  Pulm: clear, no crackles or wheezing  Abd: soft, nontender, no splenomegaly  Ext: no LE edema  Neuro: no focal deficits, affect and cognition are normal; ambulating independently    Labs:   9/3/2021 14:06   WBC 4.5   Hemoglobin 14.1   Hematocrit 42.5 "   Platelet Count 196   RBC Count 4.65   MCV 91   MCH 30.3   MCHC 33.2   RDW 13.6   % Neutrophils 51   % Lymphocytes 37   % Monocytes 7   % Eosinophils 4   Absolute Basophils 0.1   % Basophils 1   Absolute Eosinophils 0.2   Absolute Immature Granulocytes 0.0   Absolute Lymphocytes 1.7   Absolute Monocytes 0.3   % Immature Granulocytes 0   Absolute Neutrophils 2.3        8/6/2021 17:01 9/3/2021 14:06   Sodium 143 141   Potassium 4.2 4.0   Chloride 112 (H) 113 (H)   Carbon Dioxide 24 23   Urea Nitrogen 25 19   Creatinine 0.95 1.16 (H)   GFR Estimate 63 50 (L)   Calcium 9.4 9.2   Anion Gap 7 5   Albumin 4.3 3.7   Protein Total 7.2 6.8   Bilirubin Total 0.4 0.5   Alkaline Phosphatase 72 81   ALT 36 30   AST 21 16       Imaging:  Reviewed prior imaging    Problems:  1. Right sided 1.5 cm ER+ breast cancer in 2004. She was treated with OFS and tamoxifen for 5 years.  2. IBTR of right side with small (0.3cm) invasive lobular cancer. Had bilateral mastectomy and no additional tumor was identified. No additional systemic therapy was given.  3. New diagnosis of metastatic ER+ lobular breast cancer to skin, bones, and nodes.  4. S/p bilateral knee replacement.    Assessment/Plan: Cait Raza is a 66 year old female with a history of recurrent right-sided ER+, GA-negative, HER2-negative breast cancer who presents for further evaluation and treatment.      MBC: currently doing well with the Verzenio and AI.  She is having some mild nausea and diarrhea.  Discussed taking a 4 mg Zofran in the AM and 1 imodium to prevent the nausea and diarrhea.  Mild elevation in the creatinine after two weeks.  We'll continue with close lab follow up, including tumor marker monthly to see if it correlates.     Bones; will need to start on bisphosphonate.  Will try and get coverage for Xgeva every 3 months.     Coping: she is doing really well.  Provided support.       45 minutes spent on the date of the encounter doing chart review, lab review,  patient visit, documentation and discussion with dr Lim.     Millie Bunn PA-C

## 2021-09-06 DIAGNOSIS — C50.919 METASTATIC BREAST CANCER: Primary | ICD-10-CM

## 2021-09-09 ENCOUNTER — TELEPHONE (OUTPATIENT)
Dept: ONCOLOGY | Facility: CLINIC | Age: 66
End: 2021-09-09

## 2021-09-09 DIAGNOSIS — C50.919 METASTATIC BREAST CANCER: Primary | ICD-10-CM

## 2021-09-09 RX ORDER — ANASTROZOLE 1 MG/1
1 TABLET ORAL DAILY
Qty: 28 TABLET | Refills: 0 | Status: SHIPPED | OUTPATIENT
Start: 2021-09-09 | End: 2021-10-07

## 2021-09-09 NOTE — TELEPHONE ENCOUNTER
Prior Authorization Approval    Authorization Effective Date: 6/11/2021  Authorization Expiration Date: 9/9/2022  Medication: ondansetron   Approved Dose/Quantity: 20/6 ds  Reference #: [id number] (Key: [id number])   Insurance Company: [employer] - Phone [phone] Fax [phone]  Expected CoPay:       CoPay Card Available:      Foundation Assistance Needed:    Which Pharmacy is filling the prescription (Not needed for infusion/clinic administered): [facility] [place], [place] - [address] [place] [address] [phone]  Pharmacy Notified: Yes  Patient Notified: Yes

## 2021-09-09 NOTE — TELEPHONE ENCOUNTER
PA Initiation    Medication: ondansetron   Insurance Company: [employer] - Phone [phone] Fax [phone]  Pharmacy Filling the Rx: [facility] [place], [place] - [address] [place] [address] [phone]  Filling Pharmacy Phone:    Filling Pharmacy Fax:    Start Date: 9/9/2021

## 2021-09-17 ENCOUNTER — LAB (OUTPATIENT)
Dept: LAB | Facility: CLINIC | Age: 66
End: 2021-09-17
Payer: COMMERCIAL

## 2021-09-17 DIAGNOSIS — C50.919 METASTATIC BREAST CANCER: ICD-10-CM

## 2021-09-17 LAB
BASOPHILS # BLD AUTO: 0.1 10E3/UL (ref 0–0.2)
BASOPHILS NFR BLD AUTO: 1 %
EOSINOPHIL # BLD AUTO: 0.1 10E3/UL (ref 0–0.7)
EOSINOPHIL NFR BLD AUTO: 2 %
ERYTHROCYTE [DISTWIDTH] IN BLOOD BY AUTOMATED COUNT: 14.4 % (ref 10–15)
HCT VFR BLD AUTO: 40.6 % (ref 35–47)
HGB BLD-MCNC: 14.1 G/DL (ref 11.7–15.7)
LYMPHOCYTES # BLD AUTO: 1.6 10E3/UL (ref 0.8–5.3)
LYMPHOCYTES NFR BLD AUTO: 27 %
MCH RBC QN AUTO: 31.4 PG (ref 26.5–33)
MCHC RBC AUTO-ENTMCNC: 34.7 G/DL (ref 31.5–36.5)
MCV RBC AUTO: 90 FL (ref 78–100)
MONOCYTES # BLD AUTO: 0.4 10E3/UL (ref 0–1.3)
MONOCYTES NFR BLD AUTO: 7 %
NEUTROPHILS # BLD AUTO: 3.9 10E3/UL (ref 1.6–8.3)
NEUTROPHILS NFR BLD AUTO: 64 %
PLATELET # BLD AUTO: 244 10E3/UL (ref 150–450)
RBC # BLD AUTO: 4.49 10E6/UL (ref 3.8–5.2)
WBC # BLD AUTO: 6 10E3/UL (ref 4–11)

## 2021-09-17 PROCEDURE — 36415 COLL VENOUS BLD VENIPUNCTURE: CPT

## 2021-09-17 PROCEDURE — 85025 COMPLETE CBC W/AUTO DIFF WBC: CPT

## 2021-09-17 PROCEDURE — 80053 COMPREHEN METABOLIC PANEL: CPT

## 2021-09-18 LAB
ALBUMIN SERPL-MCNC: 3.8 G/DL (ref 3.4–5)
ALP SERPL-CCNC: 84 U/L (ref 40–150)
ALT SERPL W P-5'-P-CCNC: 28 U/L (ref 0–50)
ANION GAP SERPL CALCULATED.3IONS-SCNC: 4 MMOL/L (ref 3–14)
AST SERPL W P-5'-P-CCNC: 16 U/L (ref 0–45)
BILIRUB SERPL-MCNC: 0.4 MG/DL (ref 0.2–1.3)
BUN SERPL-MCNC: 23 MG/DL (ref 7–30)
CALCIUM SERPL-MCNC: 9 MG/DL (ref 8.5–10.1)
CHLORIDE BLD-SCNC: 108 MMOL/L (ref 94–109)
CO2 SERPL-SCNC: 29 MMOL/L (ref 20–32)
CREAT SERPL-MCNC: 1.33 MG/DL (ref 0.52–1.04)
GFR SERPL CREATININE-BSD FRML MDRD: 42 ML/MIN/1.73M2
GLUCOSE BLD-MCNC: 87 MG/DL (ref 70–99)
POTASSIUM BLD-SCNC: 4.4 MMOL/L (ref 3.4–5.3)
PROT SERPL-MCNC: 6.8 G/DL (ref 6.8–8.8)
SODIUM SERPL-SCNC: 141 MMOL/L (ref 133–144)

## 2021-09-29 ENCOUNTER — PATIENT OUTREACH (OUTPATIENT)
Dept: ONCOLOGY | Facility: CLINIC | Age: 66
End: 2021-09-29

## 2021-09-29 DIAGNOSIS — C50.919 METASTATIC BREAST CANCER: Primary | ICD-10-CM

## 2021-09-29 NOTE — PROGRESS NOTES
Patient asking about seeing a Nutritionist for healthier eating education. Nutrition referral placed as patient requested.  Answered all patient's questions and verbalized understanding. Georegtte Arrieta RN, BSN.

## 2021-10-01 ENCOUNTER — ONCOLOGY VISIT (OUTPATIENT)
Dept: ONCOLOGY | Facility: CLINIC | Age: 66
End: 2021-10-01
Attending: PHYSICIAN ASSISTANT
Payer: COMMERCIAL

## 2021-10-01 ENCOUNTER — APPOINTMENT (OUTPATIENT)
Dept: LAB | Facility: CLINIC | Age: 66
End: 2021-10-01
Attending: PHYSICIAN ASSISTANT
Payer: COMMERCIAL

## 2021-10-01 ENCOUNTER — ANCILLARY PROCEDURE (OUTPATIENT)
Dept: CT IMAGING | Facility: CLINIC | Age: 66
End: 2021-10-01
Attending: PHYSICIAN ASSISTANT
Payer: COMMERCIAL

## 2021-10-01 VITALS
OXYGEN SATURATION: 95 % | RESPIRATION RATE: 18 BRPM | SYSTOLIC BLOOD PRESSURE: 122 MMHG | HEART RATE: 66 BPM | BODY MASS INDEX: 32.77 KG/M2 | WEIGHT: 185 LBS | TEMPERATURE: 97.5 F | DIASTOLIC BLOOD PRESSURE: 78 MMHG

## 2021-10-01 DIAGNOSIS — C50.919 METASTATIC BREAST CANCER: Primary | ICD-10-CM

## 2021-10-01 DIAGNOSIS — Z23 NEED FOR PROPHYLACTIC VACCINATION AND INOCULATION AGAINST INFLUENZA: ICD-10-CM

## 2021-10-01 DIAGNOSIS — C79.51 BONE METASTASIS: ICD-10-CM

## 2021-10-01 DIAGNOSIS — C50.919 METASTATIC BREAST CANCER: ICD-10-CM

## 2021-10-01 LAB
ALBUMIN SERPL-MCNC: 3.8 G/DL (ref 3.4–5)
ALP SERPL-CCNC: 72 U/L (ref 40–150)
ALT SERPL W P-5'-P-CCNC: 26 U/L (ref 0–50)
ANION GAP SERPL CALCULATED.3IONS-SCNC: 7 MMOL/L (ref 3–14)
AST SERPL W P-5'-P-CCNC: 19 U/L (ref 0–45)
BASOPHILS # BLD AUTO: 0.1 10E3/UL (ref 0–0.2)
BASOPHILS NFR BLD AUTO: 2 %
BILIRUB SERPL-MCNC: 0.4 MG/DL (ref 0.2–1.3)
BUN SERPL-MCNC: 22 MG/DL (ref 7–30)
CALCIUM SERPL-MCNC: 9.2 MG/DL (ref 8.5–10.1)
CHLORIDE BLD-SCNC: 112 MMOL/L (ref 94–109)
CO2 SERPL-SCNC: 24 MMOL/L (ref 20–32)
CREAT SERPL-MCNC: 1.06 MG/DL (ref 0.52–1.04)
EOSINOPHIL # BLD AUTO: 0.1 10E3/UL (ref 0–0.7)
EOSINOPHIL NFR BLD AUTO: 4 %
ERYTHROCYTE [DISTWIDTH] IN BLOOD BY AUTOMATED COUNT: 14.9 % (ref 10–15)
GFR SERPL CREATININE-BSD FRML MDRD: 55 ML/MIN/1.73M2
GLUCOSE BLD-MCNC: 101 MG/DL (ref 70–99)
HCT VFR BLD AUTO: 38.3 % (ref 35–47)
HGB BLD-MCNC: 13.2 G/DL (ref 11.7–15.7)
IMM GRANULOCYTES # BLD: 0 10E3/UL
IMM GRANULOCYTES NFR BLD: 0 %
LYMPHOCYTES # BLD AUTO: 1.5 10E3/UL (ref 0.8–5.3)
LYMPHOCYTES NFR BLD AUTO: 38 %
MCH RBC QN AUTO: 30.7 PG (ref 26.5–33)
MCHC RBC AUTO-ENTMCNC: 34.5 G/DL (ref 31.5–36.5)
MCV RBC AUTO: 89 FL (ref 78–100)
MONOCYTES # BLD AUTO: 0.4 10E3/UL (ref 0–1.3)
MONOCYTES NFR BLD AUTO: 9 %
NEUTROPHILS # BLD AUTO: 1.9 10E3/UL (ref 1.6–8.3)
NEUTROPHILS NFR BLD AUTO: 47 %
NRBC # BLD AUTO: 0 10E3/UL
NRBC BLD AUTO-RTO: 0 /100
PLATELET # BLD AUTO: 235 10E3/UL (ref 150–450)
POTASSIUM BLD-SCNC: 4.1 MMOL/L (ref 3.4–5.3)
PROT SERPL-MCNC: 6.8 G/DL (ref 6.8–8.8)
RBC # BLD AUTO: 4.3 10E6/UL (ref 3.8–5.2)
SODIUM SERPL-SCNC: 143 MMOL/L (ref 133–144)
WBC # BLD AUTO: 4 10E3/UL (ref 4–11)

## 2021-10-01 PROCEDURE — G0008 ADMIN INFLUENZA VIRUS VAC: HCPCS | Performed by: PHYSICIAN ASSISTANT

## 2021-10-01 PROCEDURE — 82040 ASSAY OF SERUM ALBUMIN: CPT | Performed by: PHYSICIAN ASSISTANT

## 2021-10-01 PROCEDURE — G0463 HOSPITAL OUTPT CLINIC VISIT: HCPCS | Mod: 25

## 2021-10-01 PROCEDURE — 36415 COLL VENOUS BLD VENIPUNCTURE: CPT | Performed by: PHYSICIAN ASSISTANT

## 2021-10-01 PROCEDURE — 90662 IIV NO PRSV INCREASED AG IM: CPT | Performed by: PHYSICIAN ASSISTANT

## 2021-10-01 PROCEDURE — 250N000011 HC RX IP 250 OP 636: Performed by: PHYSICIAN ASSISTANT

## 2021-10-01 PROCEDURE — 71250 CT THORAX DX C-: CPT | Mod: GC | Performed by: RADIOLOGY

## 2021-10-01 PROCEDURE — 85025 COMPLETE CBC W/AUTO DIFF WBC: CPT | Performed by: PHYSICIAN ASSISTANT

## 2021-10-01 PROCEDURE — 99215 OFFICE O/P EST HI 40 MIN: CPT | Performed by: PHYSICIAN ASSISTANT

## 2021-10-01 PROCEDURE — 96372 THER/PROPH/DIAG INJ SC/IM: CPT | Performed by: PHYSICIAN ASSISTANT

## 2021-10-01 RX ADMIN — INFLUENZA A VIRUS A/VICTORIA/2570/2019 IVR-215 (H1N1) ANTIGEN (FORMALDEHYDE INACTIVATED), INFLUENZA A VIRUS A/TASMANIA/503/2020 IVR-221 (H3N2) ANTIGEN (FORMALDEHYDE INACTIVATED), INFLUENZA B VIRUS B/PHUKET/3073/2013 ANTIGEN (FORMALDEHYDE INACTIVATED), AND INFLUENZA B VIRUS B/WASHINGTON/02/2019 ANTIGEN (FORMALDEHYDE INACTIVATED) 0.7 ML: 60; 60; 60; 60 INJECTION, SUSPENSION INTRAMUSCULAR at 09:18

## 2021-10-01 RX ADMIN — DENOSUMAB 120 MG: 120 INJECTION SUBCUTANEOUS at 09:21

## 2021-10-01 ASSESSMENT — PAIN SCALES - GENERAL: PAINLEVEL: NO PAIN (0)

## 2021-10-01 NOTE — NURSING NOTE
"Oncology Rooming Note    October 1, 2021 7:52 AM   Cait Raza is a 66 year old female who presents for:    Chief Complaint   Patient presents with     Labs Only     venipuncture, vitals checked     Oncology Clinic Visit     BREAST CANCER      Initial Vitals: /78   Pulse 66   Temp 97.5  F (36.4  C)   Resp 18   Wt 83.9 kg (185 lb)   LMP 07/26/2004   SpO2 95%   BMI 32.77 kg/m   Estimated body mass index is 32.77 kg/m  as calculated from the following:    Height as of 9/3/21: 1.6 m (5' 3\").    Weight as of this encounter: 83.9 kg (185 lb). Body surface area is 1.93 meters squared.  No Pain (0) Comment: Data Unavailable   Patient's last menstrual period was 07/26/2004.  Allergies reviewed: Yes  Medications reviewed: Yes    Medications: Medication refills not needed today.  Pharmacy name entered into [facility]:    [facility] - [place], [place] - [address] [id number] [place], [place] - [address] [place] [address] MAIL/SPECIALTY PHARMACY - [place], [place] - [address] [facility]  [facility] - USE FOR MAILING ONLY - [facility]    Clinical concerns: NONE       Felicity Ozuna CMA              "

## 2021-10-01 NOTE — NURSING NOTE
Patient was given Xgeva in right lower abdomen. Patient tolerated well. See MAR for details.    Influenza vaccine given to patient in Right Deltoid . Patient tolerated injection without any incidents.     Has the patient received the information for the injectable influenza vaccine? YES    Margoth Contreras LPN on 10/1/2021 at 9:27 AM

## 2021-10-01 NOTE — PROGRESS NOTES
"    [facility]   [address] [place] [id number]  Phone: [phone]          Reason for Consultation:  Cait Raza is returns for evaluation and treatment of recurrent ER+BC      History: Cait Raza is a 66 year old patient with a new diagnosis of metastatic breast cancer.        Oncology History    1. History of right sided node negative, ER positive, IA positive breast cancer. Her initial tumor was 1.5 cm. She was on Zoladex from 11/2004 until 11/2005. She had menstrual cycles return in 2007 and she was restarted on the Zoladex in 03/2007 until 04/2009. She was on tamoxifen from 12/2004 until 11/2009. She had been on no therapy since November 2009.     2/ On 7/21/2015 screening mammogram revealed right sided lesion that was biopsied and found to be invasive lobular, ER+, IA-negative, HER2-negative.  She decided to have bilateral mastectomy which was done by Dr. Mendoza at [facility] on 9/15/15.  The bilateral mastectomy revealed no residual tumor.  She had bilateral SNLBx which were both negative.  At [facility] she was staged at T1a (0.3 cm - presumably by our biopsy). N0, M0 breast cancer. At that time, we discussed the possibility of adjuvant endocrine therapy, but by my notes I stated that \"it's hard to get overly enthusiastic about any systemic adjuvant therapy\". I have not seen her again since that visit in 10-      She noted a skin lesion on her right chest, she thinks it was about at the same site of a surgical drain. She was seen by a dermatologist who performed a biopsy of the new lesion on right chest wall revealed dermal involvement by invasive lobular carcinoma (6mm); ER+, IA-negative, HER2-negative (1+).  She has seen Dr. Pace and Andra for further evaluation. Further workup with PET imaging revealed FDG-avid sites in right scapula, thoracic spine, right iliac, bilateral axillary/subpectoral, and left supraclavicular/base of neck node. She has established care with " Radiation Oncology. Genetic testing was previously negative.     She returns to clinic for further evaluation and treatment in relation to above. She is here with a friend. She last saw Dr. Lim in 2015 and recently reestablished care with him and started on Verzenio.     INTERVAL HISTORY:  Cait is 6 weeks into her Verzenio.  She has had some mild nausea and uses mostly alex.  She also has some mild diarrhea (2-3 a day) thus far and uses diet and prn imodium for this.  Her energy levels are fair, she is very active daily tennis, walking and babysitting for her 5 month old grandson.  Eating healthy and keeping her mood positive.  No bone pains.  Noted improvement to the skin lesions on her chest. Has a paroxysmal cough that is new.  No new URI symptoms.       Surgical history is reviewed in the EMR and is notable for bilateral total knee arthroplasty, bilateral mastectomy (2015).     Medications are reviewed in the EMR and notable for pravastatin, sertraline, metoprolol, ezetimibe, and multivitamins.       Physical Exam:  /78   Pulse 66   Temp 97.5  F (36.4  C)   Resp 18   Wt 83.9 kg (185 lb)   LMP 07/26/2004   SpO2 95%   BMI 32.77 kg/m    Wt Readings from Last 4 Encounters:   10/01/21 83.9 kg (185 lb)   09/03/21 83.7 kg (184 lb 8 oz)   08/06/21 84.1 kg (185 lb 8 oz)   10/09/15 80.8 kg (178 lb 1.6 oz)       Exam:   Gen: Appears well, no distress, pleasant, interactive  HEENT: no scleral icterus or hemorrhage, no lymphadenopathy  Chest: bilateral mastectomy, multiple firm, raised red lesions on right chest, biopsy site without evidence of drainage. 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 Except for the biopsy site, there is no evidence of skin erosion in any of these cutaneous sites.  CV: regular, no murmurs  Pulm: clear, no crackles or wheezing  Abd: soft, nontender, no splenomegaly  Ext: no LE edema  Neuro: no focal deficits, affect and cognition are normal;  ambulating independently    First picture         After 1 month of treatment.       Labs:  Results for SHELBI NI (MRN [id number]) as of 10/1/2021 17:27   9/3/2021 14:06 9/17/2021 13:21 10/1/2021 07:37   Sodium 141 141 143   Potassium 4.0 4.4 4.1   Chloride 113 (H) 108 112 (H)   Carbon Dioxide 23 29 24   Urea Nitrogen 19 23 22   Creatinine 1.16 (H) 1.33 (H) 1.06 (H)   GFR Estimate 50 (L) 42 (L) 55 (L)   Calcium 9.2 9.0 9.2   Anion Gap 5 4 7   Albumin 3.7 3.8 3.8   Protein Total 6.8 6.8 6.8   Bilirubin Total 0.5 0.4 0.4   Alkaline Phosphatase 81 84 72   ALT 30 28 26   AST 16 16 19   Glucose 101 (H) 87 101 (H)   WBC 4.5 6.0 4.0   Hemoglobin 14.1 14.1 13.2   Hematocrit 42.5 40.6 38.3   Platelet Count 196 244 235   RBC Count 4.65 4.49 4.30   MCV 91 90 89     Imaging:  IMPRESSION: In this patient with metastatic breast cancer  1. No evidence of metastatic disease in the chest. Bilateral  mastectomy.  2. Left axillary lymphadenopathy as above, slightly decreased from  PET/CT of 8/2/2021.  3. Sclerotic changes of the thoracic vertebra as above which relates  increased metabolic activity on PET dated 8/2/2021, likely  representing bony metastasis.       Problems:  1. Right sided 1.5 cm ER+ breast cancer in 2004. She was treated with OFS and tamoxifen for 5 years.  2. IBTR of right side with small (0.3cm) invasive lobular cancer. Had bilateral mastectomy and no additional tumor was identified. No additional systemic therapy was given.  3. New diagnosis of metastatic ER+ lobular breast cancer to skin, bones, and nodes.  4. S/p bilateral knee replacement.    Assessment/Plan: Shelbi Ni is a 66 year old female with a history of recurrent right-sided ER+, PA-negative, HER2-negative breast cancer who presents for further evaluation and treatment.      MBC: currently doing well with the Verzenio and AI.  She is having some mild nausea and diarrhea.  Discussed taking a 4 mg Zofran in the AM and 1 imodium to prevent the  nausea and diarrhea.  Clinical improvement after ~6 weeks of treatment.  Will add on tumor markers for next blood draw.  For now, continue with therapy.     Cough: new new drugs, no new URI.  Sent for CT chest done to rule out pneumonitis.  Neg.  Will monitor    Bone metastasis; will start Xgeva every 3 months. Taking calcium and Vit D    Coping: she is doing really well.  Provided support.       60 minutes spent on the date of the encounter doing chart review, lab review, patient visit, documentation.     Millie Bunn PA-C

## 2021-10-01 NOTE — NURSING NOTE
Chief Complaint   Patient presents with     Labs Only     venipuncture, vitals checked     Shahnaz Villeda RN on 10/1/2021 at 7:38 AM

## 2021-10-07 DIAGNOSIS — C50.919 METASTATIC BREAST CANCER: Primary | ICD-10-CM

## 2021-10-07 RX ORDER — ANASTROZOLE 1 MG/1
1 TABLET ORAL DAILY
Qty: 28 TABLET | Refills: 0 | Status: SHIPPED | OUTPATIENT
Start: 2021-10-07 | End: 2021-11-04

## 2021-10-16 ENCOUNTER — LAB (OUTPATIENT)
Dept: LAB | Facility: CLINIC | Age: 66
End: 2021-10-16
Payer: COMMERCIAL

## 2021-10-16 DIAGNOSIS — E78.00 PURE HYPERCHOLESTEROLEMIA: Primary | ICD-10-CM

## 2021-10-16 DIAGNOSIS — C50.919 METASTATIC BREAST CANCER: ICD-10-CM

## 2021-10-16 LAB
ALBUMIN SERPL-MCNC: 3.8 G/DL (ref 3.4–5)
ALP SERPL-CCNC: 70 U/L (ref 40–150)
ALT SERPL W P-5'-P-CCNC: 29 U/L (ref 0–50)
ALT SERPL W P-5'-P-CCNC: 30 U/L (ref 0–50)
ANION GAP SERPL CALCULATED.3IONS-SCNC: 5 MMOL/L (ref 3–14)
AST SERPL W P-5'-P-CCNC: 18 U/L (ref 0–45)
AST SERPL W P-5'-P-CCNC: 19 U/L (ref 0–45)
BASOPHILS # BLD AUTO: 0.1 10E3/UL (ref 0–0.2)
BASOPHILS NFR BLD AUTO: 3 %
BILIRUB SERPL-MCNC: 0.6 MG/DL (ref 0.2–1.3)
BUN SERPL-MCNC: 20 MG/DL (ref 7–30)
CALCIUM SERPL-MCNC: 9.1 MG/DL (ref 8.5–10.1)
CANCER AG27-29 SERPL-ACNC: 164 U/ML (ref 0–39)
CHLORIDE BLD-SCNC: 110 MMOL/L (ref 94–109)
CHOLEST SERPL-MCNC: 225 MG/DL
CO2 SERPL-SCNC: 26 MMOL/L (ref 20–32)
CREAT SERPL-MCNC: 1.1 MG/DL (ref 0.52–1.04)
EOSINOPHIL # BLD AUTO: 0.1 10E3/UL (ref 0–0.7)
EOSINOPHIL NFR BLD AUTO: 3 %
ERYTHROCYTE [DISTWIDTH] IN BLOOD BY AUTOMATED COUNT: 15.8 % (ref 10–15)
FASTING STATUS PATIENT QL REPORTED: YES
GFR SERPL CREATININE-BSD FRML MDRD: 53 ML/MIN/1.73M2
GLUCOSE BLD-MCNC: 99 MG/DL (ref 70–99)
HCT VFR BLD AUTO: 40.1 % (ref 35–47)
HDLC SERPL-MCNC: 87 MG/DL
HGB BLD-MCNC: 14 G/DL (ref 11.7–15.7)
LDLC SERPL CALC-MCNC: 115 MG/DL
LYMPHOCYTES # BLD AUTO: 1.4 10E3/UL (ref 0.8–5.3)
LYMPHOCYTES NFR BLD AUTO: 32 %
MCH RBC QN AUTO: 32.6 PG (ref 26.5–33)
MCHC RBC AUTO-ENTMCNC: 34.9 G/DL (ref 31.5–36.5)
MCV RBC AUTO: 93 FL (ref 78–100)
MONOCYTES # BLD AUTO: 0.4 10E3/UL (ref 0–1.3)
MONOCYTES NFR BLD AUTO: 8 %
NEUTROPHILS # BLD AUTO: 2.3 10E3/UL (ref 1.6–8.3)
NEUTROPHILS NFR BLD AUTO: 54 %
NONHDLC SERPL-MCNC: 138 MG/DL
PLATELET # BLD AUTO: 250 10E3/UL (ref 150–450)
POTASSIUM BLD-SCNC: 4.3 MMOL/L (ref 3.4–5.3)
PROT SERPL-MCNC: 7.2 G/DL (ref 6.8–8.8)
RBC # BLD AUTO: 4.3 10E6/UL (ref 3.8–5.2)
SODIUM SERPL-SCNC: 141 MMOL/L (ref 133–144)
TRIGL SERPL-MCNC: 114 MG/DL
WBC # BLD AUTO: 4.3 10E3/UL (ref 4–11)

## 2021-10-16 PROCEDURE — 85025 COMPLETE CBC W/AUTO DIFF WBC: CPT

## 2021-10-16 PROCEDURE — 84460 ALANINE AMINO (ALT) (SGPT): CPT | Mod: 59

## 2021-10-16 PROCEDURE — 36415 COLL VENOUS BLD VENIPUNCTURE: CPT

## 2021-10-16 PROCEDURE — 80061 LIPID PANEL: CPT

## 2021-10-16 PROCEDURE — 80053 COMPREHEN METABOLIC PANEL: CPT

## 2021-10-16 PROCEDURE — 86300 IMMUNOASSAY TUMOR CA 15-3: CPT

## 2021-10-16 PROCEDURE — 84450 TRANSFERASE (AST) (SGOT): CPT | Mod: 59

## 2021-10-24 ENCOUNTER — HEALTH MAINTENANCE LETTER (OUTPATIENT)
Age: 66
End: 2021-10-24

## 2021-10-29 ENCOUNTER — ONCOLOGY VISIT (OUTPATIENT)
Dept: ONCOLOGY | Facility: CLINIC | Age: 66
End: 2021-10-29
Attending: INTERNAL MEDICINE
Payer: COMMERCIAL

## 2021-10-29 ENCOUNTER — APPOINTMENT (OUTPATIENT)
Dept: LAB | Facility: CLINIC | Age: 66
End: 2021-10-29
Attending: INTERNAL MEDICINE
Payer: COMMERCIAL

## 2021-10-29 VITALS
SYSTOLIC BLOOD PRESSURE: 128 MMHG | OXYGEN SATURATION: 97 % | TEMPERATURE: 97.7 F | WEIGHT: 186 LBS | HEART RATE: 68 BPM | BODY MASS INDEX: 32.95 KG/M2 | DIASTOLIC BLOOD PRESSURE: 83 MMHG | RESPIRATION RATE: 18 BRPM

## 2021-10-29 DIAGNOSIS — C50.112 MALIGNANT NEOPLASM OF CENTRAL PORTION OF LEFT BREAST IN FEMALE, ESTROGEN RECEPTOR POSITIVE (H): ICD-10-CM

## 2021-10-29 DIAGNOSIS — Z17.0 MALIGNANT NEOPLASM OF CENTRAL PORTION OF LEFT BREAST IN FEMALE, ESTROGEN RECEPTOR POSITIVE (H): ICD-10-CM

## 2021-10-29 DIAGNOSIS — C50.919 METASTATIC BREAST CANCER: Primary | ICD-10-CM

## 2021-10-29 LAB
ALBUMIN SERPL-MCNC: 3.7 G/DL (ref 3.4–5)
ALP SERPL-CCNC: 60 U/L (ref 40–150)
ALT SERPL W P-5'-P-CCNC: 26 U/L (ref 0–50)
ANION GAP SERPL CALCULATED.3IONS-SCNC: 3 MMOL/L (ref 3–14)
AST SERPL W P-5'-P-CCNC: 14 U/L (ref 0–45)
BASOPHILS # BLD AUTO: 0.1 10E3/UL (ref 0–0.2)
BASOPHILS NFR BLD AUTO: 2 %
BILIRUB SERPL-MCNC: 0.5 MG/DL (ref 0.2–1.3)
BUN SERPL-MCNC: 17 MG/DL (ref 7–30)
CALCIUM SERPL-MCNC: 8.9 MG/DL (ref 8.5–10.1)
CANCER AG27-29 SERPL-ACNC: 137 U/ML (ref 0–39)
CHLORIDE BLD-SCNC: 113 MMOL/L (ref 94–109)
CO2 SERPL-SCNC: 24 MMOL/L (ref 20–32)
CREAT SERPL-MCNC: 1.01 MG/DL (ref 0.52–1.04)
EOSINOPHIL # BLD AUTO: 0.1 10E3/UL (ref 0–0.7)
EOSINOPHIL NFR BLD AUTO: 3 %
ERYTHROCYTE [DISTWIDTH] IN BLOOD BY AUTOMATED COUNT: 15.7 % (ref 10–15)
GFR SERPL CREATININE-BSD FRML MDRD: 59 ML/MIN/1.73M2
GLUCOSE BLD-MCNC: 97 MG/DL (ref 70–99)
HCT VFR BLD AUTO: 39.4 % (ref 35–47)
HGB BLD-MCNC: 13.4 G/DL (ref 11.7–15.7)
IMM GRANULOCYTES # BLD: 0 10E3/UL
IMM GRANULOCYTES NFR BLD: 0 %
LYMPHOCYTES # BLD AUTO: 1.5 10E3/UL (ref 0.8–5.3)
LYMPHOCYTES NFR BLD AUTO: 36 %
MCH RBC QN AUTO: 31.4 PG (ref 26.5–33)
MCHC RBC AUTO-ENTMCNC: 34 G/DL (ref 31.5–36.5)
MCV RBC AUTO: 92 FL (ref 78–100)
MONOCYTES # BLD AUTO: 0.4 10E3/UL (ref 0–1.3)
MONOCYTES NFR BLD AUTO: 9 %
NEUTROPHILS # BLD AUTO: 2.1 10E3/UL (ref 1.6–8.3)
NEUTROPHILS NFR BLD AUTO: 50 %
NRBC # BLD AUTO: 0 10E3/UL
NRBC BLD AUTO-RTO: 0 /100
PLATELET # BLD AUTO: 248 10E3/UL (ref 150–450)
POTASSIUM BLD-SCNC: 3.9 MMOL/L (ref 3.4–5.3)
PROT SERPL-MCNC: 6.8 G/DL (ref 6.8–8.8)
RBC # BLD AUTO: 4.27 10E6/UL (ref 3.8–5.2)
SODIUM SERPL-SCNC: 140 MMOL/L (ref 133–144)
WBC # BLD AUTO: 4.3 10E3/UL (ref 4–11)

## 2021-10-29 PROCEDURE — 82040 ASSAY OF SERUM ALBUMIN: CPT | Performed by: INTERNAL MEDICINE

## 2021-10-29 PROCEDURE — 36415 COLL VENOUS BLD VENIPUNCTURE: CPT | Performed by: INTERNAL MEDICINE

## 2021-10-29 PROCEDURE — 85025 COMPLETE CBC W/AUTO DIFF WBC: CPT | Performed by: INTERNAL MEDICINE

## 2021-10-29 PROCEDURE — 99214 OFFICE O/P EST MOD 30 MIN: CPT | Performed by: INTERNAL MEDICINE

## 2021-10-29 PROCEDURE — 86300 IMMUNOASSAY TUMOR CA 15-3: CPT | Performed by: PHYSICIAN ASSISTANT

## 2021-10-29 PROCEDURE — G0463 HOSPITAL OUTPT CLINIC VISIT: HCPCS

## 2021-10-29 ASSESSMENT — PAIN SCALES - GENERAL: PAINLEVEL: NO PAIN (1)

## 2021-10-29 NOTE — LETTER
"    10/29/2021         RE: Cait Raza  [address] [id number]        Dear Colleague,    Thank you for referring your patient, Cait Raza, to the [facility]. Please see a copy of my visit note below.        [facility]   [address] [place] [address]  Phone: [phone]          Reason for Consultation:  Cait Raza is returns for evaluation and treatment of recurrent ER+BC  Cait Raza is a 66 year old woman with metastatic breast cancer.      Oncology History    1. History of right sided node negative, ER positive, MI positive breast cancer. Her initial tumor was 1.5 cm. She was on Zoladex from 11/2004 until 11/2005. She had menstrual cycles return in 2007 and she was restarted on the Zoladex in 03/2007 until 04/2009. She was on tamoxifen from 12/2004 until 11/2009. She had been on no therapy since November 2009.     2/ On 7/21/2015 screening mammogram revealed right sided lesion that was biopsied and found to be invasive lobular, ER+, MI-negative, HER2-negative.  She decided to have bilateral mastectomy which was done by Dr. Mendoza at [facility] on 9/15/15.  The bilateral mastectomy revealed no residual tumor.  She had bilateral SNLBx which were both negative.  At [facility] she was staged at T1a (0.3 cm - presumably by our biopsy). N0, M0 breast cancer. At that time, we discussed the possibility of adjuvant endocrine therapy, but by my notes I stated that \"it's hard to get overly enthusiastic about any systemic adjuvant therapy\". I have not seen her again since that visit in 10-    3.She noted a skin lesion on her right chest, she thinks it was about at the same site of a surgical drain. She was seen by a dermatologist who performed a biopsy of the new lesion on right chest wall revealed dermal involvement by invasive lobular carcinoma (6mm); ER+, MI-negative, HER2-negative (1+).  She has seen Dr. Pace and Andra for further " evaluation. Further workup with PET imaging revealed FDG-avid sites in right scapula, thoracic spine, right iliac, bilateral axillary/subpectoral, and left supraclavicular/base of neck node. She has established care with Radiation Oncology. Genetic testing was previously negative.     She returns to clinic for further evaluation and treatment in relation to above. She is here with a friend. She last saw Dr. Lim in 2015 and recently reestablished care with him and started on abemaciclib and anastrozole in the end of 8-2021.    INTERVAL HISTORY:      Ms. Garrison returns after several week interval.  Since she was last seen by Ms. Contrerast she has done fairly well.  Her bowels are reasonably well controlled and the worst of the diarrhea is over.  She does have some fatigue.  Overall she thinks the nodules on her chest are getting smaller.    She did have a fall in September and hit her head.  She says the spot on her head is still sore and she is a little bit worried that she has a concussion syndrome.  She states that her thinking and headaches have been part of a problem but she also wonders if that is the new medication.    She continues to have cough.  She states that it can be severe enough that she actually has emesis.  It is not associated with shortness of breath.  As noted in an earlier note she had a chest CT which showed no evidence of interstitial lung disease.    Today she was seen with her .  They plan to winter in [place] and would hope to go with the next several weeks.  She hopes to stay through March. We will evaluate a treatment plan as outlined below.    Review of systems is otherwise largely unremarkable.    SOCIAL HISTORY: She has a new grandchild in the [facility].  It is possible she will go flight to that area and then return here to drive to the [place] area.    Physical Exam:  /83   Pulse 68   Temp 97.7  F (36.5  C)   Resp 18   Wt 84.4 kg (186 lb)   LMP 07/26/2004    SpO2 97%   BMI 32.95 kg/m    Wt Readings from Last 4 Encounters:   10/29/21 84.4 kg (186 lb)   10/01/21 83.9 kg (185 lb)   09/03/21 83.7 kg (184 lb 8 oz)   08/06/21 84.1 kg (185 lb 8 oz)       Exam:   Gen: Appears well, no distress, pleasant, interactive  HEENT: no scleral icterus or hemorrhage, no lymphadenopathy  Chest: bilateral mastectomy, multiple firm, raised red lesions on right chest, biopsy site without evidence of drainage. These continue to flatten and look overall improved.  CV: regular, no murmurs  Pulm: clear, no crackles or wheezing  Abd: soft, nontender, no splenomegaly  Ext: no LE edema  Neuro: no focal deficits, affect and cognition are normal; ambulating independently     Labs: CBC is unremarkable. She has pending markers and metabolic profile.         Problems:  1. Right sided 1.5 cm ER+ breast cancer in 2004. She was treated with OFS and tamoxifen for 5 years.  2. IBTR of right side with small (0.3cm) invasive lobular cancer. Had bilateral mastectomy and no additional tumor was identified. No additional systemic therapy was given.  3. New diagnosis of metastatic ER+ lobular breast cancer to skin, bones, and nodes.  4. S/p bilateral knee replacement.  5. Cough      Assessment: The patient is doing well on her anastrozole and abemaciclib.  She hopefully will continue this course and would like to winter in [place].  Prior to her leaving    I cannot the [facility] we will get a PET/CT.  This will help us evaluate her lung tissue as well as assess the metabolic activity of her known metastatic disease.    I will ask my colleague Dr. Mitchell to see if she is aware of any medical oncologist in the [facility].  The patient and her  state that they would not have a problem traveling to [place] if that were the best option.    PLAN    1. We will get PET/CT in the next week or 2    2. I will contact Dr. Rosemary Mitchell to see if she is aware of an oncologist in that area    3.  I will touch base with her regarding the results.          Again, thank you for allowing me to participate in the care of your patient.      Sincerely,    Eleazar Lim MD

## 2021-10-29 NOTE — NURSING NOTE
"Oncology Rooming Note    October 29, 2021 10:32 AM   Cait Raza is a 66 year old female who presents for:    Chief Complaint   Patient presents with     Labs Only     venipuncture, vitals checked     Oncology Clinic Visit     Metastatic breast cancer (H)     Initial Vitals: /83   Pulse 68   Temp 97.7  F (36.5  C)   Resp 18   Wt 84.4 kg (186 lb)   LMP 07/26/2004   SpO2 97%   BMI 32.95 kg/m   Estimated body mass index is 32.95 kg/m  as calculated from the following:    Height as of 9/3/21: 1.6 m (5' 3\").    Weight as of this encounter: 84.4 kg (186 lb). Body surface area is 1.94 meters squared.  No Pain (1) Comment: Data Unavailable   Patient's last menstrual period was 07/26/2004.  Allergies reviewed: Yes  Medications reviewed: Yes    Medications: Medication refills not needed today.  Pharmacy name entered into [employer]:    [facility] - [place], [place] - [address] [id number] [place], [place] - [address] AT [address] [place] [address] [facility] - [place], [place] - [address] [facility]  [facility] - USE FOR MAILING ONLY - [facility]    Clinical concerns: Patient reports left-sided chest pain-intermittent-began today.        Smiley Alexander LPN October 29, 2021 10:33 AM                "

## 2021-10-29 NOTE — NURSING NOTE
Chief Complaint   Patient presents with     Labs Only     venipuncture, vitals checked     Shahnaz Villeda RN on 10/29/2021 at 10:08 AM

## 2021-10-29 NOTE — PROGRESS NOTES
"    [address] [address]  Phone: [phone]          Reason for Consultation:  Cait Raza is returns for evaluation and treatment of recurrent ER+BC  Cait Raza is a 66 year old woman with metastatic breast cancer.      Oncology History    1. History of right sided node negative, ER positive, UT positive breast cancer. Her initial tumor was 1.5 cm. She was on Zoladex from 11/2004 until 11/2005. She had menstrual cycles return in 2007 and she was restarted on the Zoladex in 03/2007 until 04/2009. She was on tamoxifen from 12/2004 until 11/2009. She had been on no therapy since November 2009.     2/ On 7/21/2015 screening mammogram revealed right sided lesion that was biopsied and found to be invasive lobular, ER+, UT-negative, HER2-negative.  She decided to have bilateral mastectomy which was done by Dr. Mendoza at [facility] on 9/15/15.  The bilateral mastectomy revealed no residual tumor.  She had bilateral SNLBx which were both negative.  At [facility] she was staged at T1a (0.3 cm - presumably by our biopsy). N0, M0 breast cancer. At that time, we discussed the possibility of adjuvant endocrine therapy, but by my notes I stated that \"it's hard to get overly enthusiastic about any systemic adjuvant therapy\". I have not seen her again since that visit in 10-    3.She noted a skin lesion on her right chest, she thinks it was about at the same site of a surgical drain. She was seen by a dermatologist who performed a biopsy of the new lesion on right chest wall revealed dermal involvement by invasive lobular carcinoma (6mm); ER+, UT-negative, HER2-negative (1+).  She has seen Dr. Pace and Andra for further evaluation. Further workup with PET imaging revealed FDG-avid sites in right scapula, thoracic spine, right iliac, bilateral axillary/subpectoral, and left supraclavicular/base of neck node. She has established care with Radiation Oncology. Genetic testing was " previously negative.     She returns to clinic for further evaluation and treatment in relation to above. She is here with a friend. She last saw Dr. Lim in 2015 and recently reestablished care with him and started on abemaciclib and anastrozole in the end of 8-2021.    INTERVAL HISTORY:      Ms. Garrison returns after several week interval.  Since she was last seen by Ms. Bunn she has done fairly well.  Her bowels are reasonably well controlled and the worst of the diarrhea is over.  She does have some fatigue.  Overall she thinks the nodules on her chest are getting smaller.    She did have a fall in September and hit her head.  She says the spot on her head is still sore and she is a little bit worried that she has a concussion syndrome.  She states that her thinking and headaches have been part of a problem but she also wonders if that is the new medication.    She continues to have cough.  She states that it can be severe enough that she actually has emesis.  It is not associated with shortness of breath.  As noted in an earlier note she had a chest CT which showed no evidence of interstitial lung disease.    Today she was seen with her .  They plan to winter in [place] and would hope to go with the next several weeks.  She hopes to stay through March. We will evaluate a treatment plan as outlined below.    Review of systems is otherwise largely unremarkable.    SOCIAL HISTORY: She has a new grandchild in the [place] area.  It is possible she will go flight to that area and then return here to drive to the [place] area.    Physical Exam:  /83   Pulse 68   Temp 97.7  F (36.5  C)   Resp 18   Wt 84.4 kg (186 lb)   LMP 07/26/2004   SpO2 97%   BMI 32.95 kg/m    Wt Readings from Last 4 Encounters:   10/29/21 84.4 kg (186 lb)   10/01/21 83.9 kg (185 lb)   09/03/21 83.7 kg (184 lb 8 oz)   08/06/21 84.1 kg (185 lb 8 oz)       Exam:   Gen: Appears well, no distress, pleasant,  interactive  HEENT: no scleral icterus or hemorrhage, no lymphadenopathy  Chest: bilateral mastectomy, multiple firm, raised red lesions on right chest, biopsy site without evidence of drainage. These continue to flatten and look overall improved.  CV: regular, no murmurs  Pulm: clear, no crackles or wheezing  Abd: soft, nontender, no splenomegaly  Ext: no LE edema  Neuro: no focal deficits, affect and cognition are normal; ambulating independently     Labs: CBC is unremarkable. She has pending markers and metabolic profile.         Problems:  1. Right sided 1.5 cm ER+ breast cancer in 2004. She was treated with OFS and tamoxifen for 5 years.  2. IBTR of right side with small (0.3cm) invasive lobular cancer. Had bilateral mastectomy and no additional tumor was identified. No additional systemic therapy was given.  3. New diagnosis of metastatic ER+ lobular breast cancer to skin, bones, and nodes.  4. S/p bilateral knee replacement.  5. Cough      Assessment: The patient is doing well on her anastrozole and abemaciclib.  She hopefully will continue this course and would like to winter in [place].  Prior to her leaving    I cannot the [facility] area we will get a PET/CT.  This will help us evaluate her lung tissue as well as assess the metabolic activity of her known metastatic disease.    I will ask my colleague Dr. Mitchell to see if she is aware of any medical oncologist in the [place] area.  The patient and her  state that they would not have a problem traveling to [place] if that were the best option.    PLAN    1. We will get PET/CT in the next week or 2    2. I will contact Dr. Rosemary Mitchell to see if she is aware of an oncologist in that area    3. I will touch base with her regarding the results.    Eleazar Lim MD

## 2021-11-03 ENCOUNTER — DOCUMENTATION ONLY (OUTPATIENT)
Dept: ONCOLOGY | Facility: CLINIC | Age: 66
End: 2021-11-03

## 2021-11-05 DIAGNOSIS — C50.919 METASTATIC BREAST CANCER: Primary | ICD-10-CM

## 2021-11-05 RX ORDER — ANASTROZOLE 1 MG/1
1 TABLET ORAL DAILY
Qty: 28 TABLET | Refills: 0 | Status: SHIPPED | OUTPATIENT
Start: 2021-11-05 | End: 2021-12-03

## 2021-11-10 ENCOUNTER — PATIENT OUTREACH (OUTPATIENT)
Dept: ONCOLOGY | Facility: CLINIC | Age: 66
End: 2021-11-10
Payer: COMMERCIAL

## 2021-11-10 NOTE — PROGRESS NOTES
Spoke to patient with coverage issues for her PET CT requiring a peer to peer. Currently, the PET CT is scheduled 11/16/2021 and will be leaving for the winter 11/18/2021. Georgette Arrieta RN, BSN  Breast Center Nurse Coordinator   Returned call to patient and left a VM to discuss update on approval for the PET CT, but not the CT head and Dr Lim will need to place an order for a Brain MRI and a PET CT w/o the head CT portion that is not, but the Brain MRI is approved and will need to be scheduled for the Brain MRI at a later date. Georgette Arrieta RN, BSN  Breast Center Nurse Coordinator

## 2021-11-11 DIAGNOSIS — C50.919 METASTATIC BREAST CANCER: Primary | ICD-10-CM

## 2021-11-12 DIAGNOSIS — C50.919 METASTATIC BREAST CANCER: Primary | ICD-10-CM

## 2021-11-12 DIAGNOSIS — C50.112 MALIGNANT NEOPLASM OF CENTRAL PORTION OF LEFT BREAST IN FEMALE, ESTROGEN RECEPTOR POSITIVE (H): ICD-10-CM

## 2021-11-12 DIAGNOSIS — Z17.0 MALIGNANT NEOPLASM OF CENTRAL PORTION OF LEFT BREAST IN FEMALE, ESTROGEN RECEPTOR POSITIVE (H): ICD-10-CM

## 2021-11-15 ENCOUNTER — PATIENT OUTREACH (OUTPATIENT)
Dept: ONCOLOGY | Facility: CLINIC | Age: 66
End: 2021-11-15
Payer: COMMERCIAL

## 2021-11-15 DIAGNOSIS — C50.919 METASTATIC BREAST CANCER: Primary | ICD-10-CM

## 2021-11-15 NOTE — PROGRESS NOTES
Called patient and left a VM the PA department for her PET CT working on the PA for the new order Dr Lim placed. Requested a call back to discuss further. Georgette Arrieta RN, BSN  Breast Center Nurse Coordinator

## 2021-11-16 ENCOUNTER — PATIENT OUTREACH (OUTPATIENT)
Dept: ONCOLOGY | Facility: CLINIC | Age: 66
End: 2021-11-16
Payer: COMMERCIAL

## 2021-11-16 ENCOUNTER — TELEPHONE (OUTPATIENT)
Dept: ONCOLOGY | Facility: CLINIC | Age: 66
End: 2021-11-16

## 2021-11-16 DIAGNOSIS — C50.112 MALIGNANT NEOPLASM OF CENTRAL PORTION OF LEFT BREAST IN FEMALE, ESTROGEN RECEPTOR POSITIVE (H): Primary | ICD-10-CM

## 2021-11-16 DIAGNOSIS — Z17.0 MALIGNANT NEOPLASM OF CENTRAL PORTION OF LEFT BREAST IN FEMALE, ESTROGEN RECEPTOR POSITIVE (H): Primary | ICD-10-CM

## 2021-11-16 NOTE — TELEPHONE ENCOUNTER
I've spoken to her and so far her imaging is a go IF Dr Lim puts the new PET order in as her insurance does not allow the CT portion.   Georgette

## 2021-11-16 NOTE — CONFIDENTIAL NOTE
Call in today wondering if PET scan has been approved?   Call placed to prior authorization department and PET scan has been approved from 11/4/21 to 5/9/22 Authorization number [id number].   Number given to patient so she can contact her insurance company in order to double check if scan was approved with changes.   Requesting call back from RN[facility] if the scan is to go through or be cancelled due to prior authorization.

## 2021-11-16 NOTE — PROGRESS NOTES
Spoke to PA imaging Sandy Kinney [phone] with PA for her PET CT not approved at this time with PET CT scheduled this afternoon. PET CT to be cancelled and patient is aware as they continue to work on a PA. Message sent to scheduling to cancel the PET CT appointment and will get re-scheduled when PA approved.  Answered all patient's questions and verbalized understanding. Georgette Arrieta RN, BSN.

## 2021-11-24 DIAGNOSIS — C50.919 METASTATIC BREAST CANCER: Primary | ICD-10-CM

## 2021-11-30 DIAGNOSIS — C50.919 METASTATIC BREAST CANCER: Primary | ICD-10-CM

## 2021-11-30 RX ORDER — ANASTROZOLE 1 MG/1
1 TABLET ORAL DAILY
Qty: 28 TABLET | Refills: 0 | Status: SHIPPED | OUTPATIENT
Start: 2021-11-30 | End: 2021-12-28

## 2021-12-02 ENCOUNTER — TELEPHONE (OUTPATIENT)
Dept: ONCOLOGY | Facility: CLINIC | Age: 66
End: 2021-12-02
Payer: COMMERCIAL

## 2021-12-02 NOTE — TELEPHONE ENCOUNTER
Oral Chemotherapy Monitoring Program     Placed call to patient in follow up of oral chemotherapy. Left message requesting call back. No drug names were mentioned. Will update when response received.     Richmond Veloz, PharmD  PGY-2 Pharmacy Oncology Resident

## 2021-12-03 NOTE — TELEPHONE ENCOUNTER
Oral Chemotherapy Monitoring Program    Subjective/Objective:  Cait Raza is a 66 year old female contacted by phone for a follow-up visit for oral chemotherapy. Cait confirms taking the appropriate dose of Verzenio 150 mg BID. Denies new or worsening side effects, missed doses, and recent hospital or ED visits. Reports she has been tolerating her Verzenio well. Has occasional episodes of diarrhea though Cait is not sure if this is from the Verzenio or caused by something she ate. Patient has not had any recent medication changes. Cati reports her next cycle is to start Thursday 12/9. A refill of her Verzenio and anastrozole has already been set up.     Cait is in [facility] for the Winter. Saw oncologist Dr. Wally Lopes of [facility] 12/2/21 for a visit with labs. He will be following the patient while she is in [facility]. Appears he has asked Cait to return for a visit in about one month.     ORAL CHEMOTHERAPY 9/9/2021 10/7/2021 11/3/2021 11/5/2021 11/30/2021 12/2/2021 12/3/2021   Assessment Type Refill Refill Chart Review Refill Refill Left Voicemail Monthly Follow up   Diagnosis Code Breast Cancer Breast Cancer Breast Cancer Breast Cancer Breast Cancer Breast Cancer Breast Cancer   Providers Dr. Carina Lim   [facility]   Drug Name Verzenio (abemaciclib) Verzenio (abemaciclib) Verzenio (abemaciclib) Verzenio (abemaciclib) Verzenio (abemaciclib) Verzenio (abemaciclib) Verzenio (abemaciclib)   Dose 150 mg 150 mg 150 mg 150 mg 150 mg 150 mg 150 mg   Current Schedule BID BID BID BID BID BID BID   Cycle Details Continuous Continuous Continuous Continuous Continuous Continuous Continuous   Start Date of Last Cycle - - - - - - 11/11/2021   Planned next cycle start date - - - - - - 12/9/2021   Doses missed in last 2 weeks - - - - - - 0   Adherence Assessment - - - - - - Adherent   Adverse  "Effects - - - - - - No AE identified during assessment   Any new drug interactions? - - - - - - No   Is the dose as ordered appropriate for the patient? - - - - - - Yes   Is the patient currently in pain? - - - - - - -   Has the patient been assessed within the past 6 months for depression? - - - - - - -   Has the patient missed any days of school, work, or other routine activity? - - - - - - -   Since the last time we talked, have you been hospitalized or used the emergency room? - - - - - - No       Last PHQ-2 Score on record:   PHQ-2 ( 1999 Pf[employer]) 8/14/2015 7/21/2015   Q1: Little interest or pleasure in doing things 0 0   Q2: Feeling down, depressed or hopeless 0 0   PHQ-2 Score 0 0       Vitals:  BP:   BP Readings from Last 1 Encounters:   10/29/21 128/83     Wt Readings from Last 1 Encounters:   10/29/21 84.4 kg (186 lb)     Estimated body surface area is 1.94 meters squared as calculated from the following:    Height as of 9/3/21: 1.6 m (5' 3\").    Weight as of 10/29/21: 84.4 kg (186 lb).    Labs:  No results found for NA within last 30 days.     No results found for K within last 30 days.     No results found for CA within last 30 days.     No results found for Mag within last 30 days.     No results found for Phos within last 30 days.     No results found for ALBUMIN within last 30 days.     No results found for BUN within last 30 days.     No results found for CR within last 30 days.     No results found for AST within last 30 days.     No results found for ALT within last 30 days.     No results found for BILITOTAL within last 30 days.     No results found for WBC within last 30 days.     No results found for HGB within last 30 days.     No results found for PLT within last 30 days.     No results found for ANC within last 30 days.     Assessment/Plan:  Cait is tolerating her Verzenio well. No concerns noted. Is in [place] for the winter. Is being followed by oncologist Dr. Wally Lopes of [facility] " while in [place]. Will be sure to review Care Everywhere for visits and labs.     Follow-Up:  12/30: Monthly assessment. Look for appointment and labs at [facility].     Refill Due:  Refill of Verzenio and anastrozole already set up for delivery for Tuesday 12/7.    Richmond Veloz, PharmD  PGY-2 Pharmacy Oncology Resident

## 2021-12-24 DIAGNOSIS — C50.919 METASTATIC BREAST CANCER: Primary | ICD-10-CM

## 2021-12-28 ENCOUNTER — TELEPHONE (OUTPATIENT)
Dept: ONCOLOGY | Facility: CLINIC | Age: 66
End: 2021-12-28
Payer: COMMERCIAL

## 2021-12-28 DIAGNOSIS — C50.919 METASTATIC BREAST CANCER: Primary | ICD-10-CM

## 2021-12-28 RX ORDER — ANASTROZOLE 1 MG/1
1 TABLET ORAL DAILY
Qty: 28 TABLET | Refills: 0 | Status: SHIPPED | OUTPATIENT
Start: 2021-12-28 | End: 2022-01-25

## 2021-12-28 NOTE — TELEPHONE ENCOUNTER
Oral Chemotherapy Monitoring Program     Placed call to patient in follow up of oral chemotherapy. Left message requesting call back. No drug names were mentioned. Will update when response received.     Grace Myhre, PharmD  Hematology/Oncology Clinical Pharmacist  [facility]  [facility]  [phone]

## 2022-01-05 ENCOUNTER — TRANSFERRED RECORDS (OUTPATIENT)
Dept: HEALTH INFORMATION MANAGEMENT | Facility: CLINIC | Age: 67
End: 2022-01-05
Payer: COMMERCIAL

## 2022-01-06 ENCOUNTER — DOCUMENTATION ONLY (OUTPATIENT)
Dept: ONCOLOGY | Facility: CLINIC | Age: 67
End: 2022-01-06
Payer: COMMERCIAL

## 2022-01-19 DIAGNOSIS — C50.919 METASTATIC BREAST CANCER: Primary | ICD-10-CM

## 2022-01-20 ENCOUNTER — TELEPHONE (OUTPATIENT)
Dept: ONCOLOGY | Facility: CLINIC | Age: 67
End: 2022-01-20
Payer: COMMERCIAL

## 2022-01-20 DIAGNOSIS — C50.919 METASTATIC BREAST CANCER: Primary | ICD-10-CM

## 2022-01-20 RX ORDER — ANASTROZOLE 1 MG/1
1 TABLET ORAL DAILY
Qty: 28 TABLET | Refills: 0 | Status: SHIPPED | OUTPATIENT
Start: 2022-01-20 | End: 2022-02-17

## 2022-01-20 NOTE — TELEPHONE ENCOUNTER
Oral Chemotherapy Monitoring Program     Placed call to Cait Raza in follow up of Verzenio oral chemotherapy.     Left a message requesting a call back. No drug names were mentioned in the voicemail. Will update when response is received.      Erica Ortiz, PharmD, BCACP  Oral Chemotherapy Monitoring Program  [facility]  [phone]  January 20, 2022

## 2022-01-21 NOTE — TELEPHONE ENCOUNTER
Oral Chemotherapy Monitoring Program    Subjective/Objective:  Cait Raza is a 66 year old female contacted by phone for a follow-up visit for oral chemotherapy. Cait confirms taking the appropriate dose of Verzenio 150mg twice daily. Denies new or worsening side effects, missed doses, and recent hospital or ED visits. Patient has not had any recent medication changes.     ORAL CHEMOTHERAPY 12/2/2021 12/3/2021 12/28/2021 1/6/2022 1/20/2022 1/20/2022 1/21/2022   Assessment Type Left Voicemail Monthly Follow up Left Voicemail Chart Review Refill Left Voicemail Monthly Follow up   Diagnosis Code Breast Cancer Breast Cancer Breast Cancer Breast Cancer Breast Cancer Breast Cancer Breast Cancer   Providers Dr. Carina Lim   [facility]   Drug Name Verzenio (abemaciclib) Verzenio (abemaciclib) Verzenio (abemaciclib) Verzenio (abemaciclib) Verzenio (abemaciclib) Verzenio (abemaciclib) Verzenio (abemaciclib)   Dose 150 mg 150 mg 150 mg 150 mg 150 mg 150 mg 150 mg   Current Schedule BID BID BID BID BID BID BID   Cycle Details Continuous Continuous Continuous Continuous Continuous Continuous Continuous   Start Date of Last Cycle - 11/11/2021 - - - - -   Planned next cycle start date - 12/9/2021 - - - - -   Doses missed in last 2 weeks - 0 - - - - 1   Adherence Assessment - Adherent - - - - Adherent   Adverse Effects - No AE identified during assessment - - - - No AE identified during assessment   Any new drug interactions? - No - - - - No   Is the dose as ordered appropriate for the patient? - Yes - - - - Yes   Is the patient currently in pain? - - - - - - -   Has the patient been assessed within the past 6 months for depression? - - - - - - -   Has the patient missed any days of school, work, or other routine activity? - - - - - - -   Since the last time we talked, have you been hospitalized or used the emergency room?  "- No - - - - No       Last PHQ-2 Score on record:   PHQ-2 ( 1999 Pfizer) 8/14/2015 7/21/2015   Q1: Little interest or pleasure in doing things 0 0   Q2: Feeling down, depressed or hopeless 0 0   PHQ-2 Score 0 0       Vitals:  BP:   BP Readings from Last 1 Encounters:   10/29/21 128/83     Wt Readings from Last 1 Encounters:   10/29/21 84.4 kg (186 lb)     Estimated body surface area is 1.94 meters squared as calculated from the following:    Height as of 9/3/21: 1.6 m (5' 3\").    Weight as of 10/29/21: 84.4 kg (186 lb).    Labs:  No results found for NA within last 30 days.     No results found for K within last 30 days.     No results found for CA within last 30 days.     No results found for Mag within last 30 days.     No results found for Phos within last 30 days.     No results found for ALBUMIN within last 30 days.     No results found for BUN within last 30 days.     No results found for CR within last 30 days.     No results found for AST within last 30 days.     No results found for ALT within last 30 days.     No results found for BILITOTAL within last 30 days.     No results found for WBC within last 30 days.     No results found for HGB within last 30 days.     No results found for PLT within last 30 days.     No results found for ANC within last 30 days.       Assessment/Plan:  Cait continues to tolerate therapy well. PDC=1.00, no adherence concerns. Continue Verzenio therapy as planned.     Follow-Up:  2/4: appt with [place] provider at [facility]  2/17: monthly assessment    Refill Due:  [facility] to deliver on 1/26/22      Erica Ortiz, PharmD, BCACP  Hematology/Oncology Clinical Pharmacist  [facility]  [phone]    "

## 2022-02-11 DIAGNOSIS — C50.919 METASTATIC BREAST CANCER: Primary | ICD-10-CM

## 2022-02-17 ENCOUNTER — TELEPHONE (OUTPATIENT)
Dept: ONCOLOGY | Facility: CLINIC | Age: 67
End: 2022-02-17
Payer: COMMERCIAL

## 2022-02-17 DIAGNOSIS — C50.919 METASTATIC BREAST CANCER: Primary | ICD-10-CM

## 2022-02-17 RX ORDER — ANASTROZOLE 1 MG/1
1 TABLET ORAL DAILY
Qty: 28 TABLET | Refills: 0 | Status: SHIPPED | OUTPATIENT
Start: 2022-02-17 | End: 2022-03-17

## 2022-02-17 NOTE — TELEPHONE ENCOUNTER
Oral Chemotherapy Monitoring Program    Subjective/Objective:  Cait Raza is a 66 year old female contacted by phone for a follow-up visit for oral chemotherapy.  Cait confirms taking the appropriate dose of Verzenio and anastrozole. Denies new or worsening side effects, missed doses, and recent hospital or ED visits. Patient has not had any recent medication changes. She continues to have occasional diarrhea, doesn't use OTCs to treat this as it is infrequent. She is dirnking lots of liquids and makes sure to increase this even further when she is experiencing diarrhea. Her next cycle starts 3/03/22.    ORAL CHEMOTHERAPY 12/28/2021 1/6/2022 1/20/2022 1/20/2022 1/21/2022 2/17/2022 2/17/2022   Assessment Type Left Voicemail Chart Review Refill Left Voicemail Monthly Follow up Refill Monthly Follow up   Diagnosis Code Breast Cancer Breast Cancer Breast Cancer Breast Cancer Breast Cancer Breast Cancer Breast Cancer   Providers Dr. Carina Lim   [facility]   Drug Name Verzenio (abemaciclib) Verzenio (abemaciclib) Verzenio (abemaciclib) Verzenio (abemaciclib) Verzenio (abemaciclib) Verzenio (abemaciclib) Verzenio (abemaciclib)   Dose 150 mg 150 mg 150 mg 150 mg 150 mg 150 mg 150 mg   Current Schedule BID BID BID BID BID BID BID   Cycle Details Continuous Continuous Continuous Continuous Continuous Continuous Continuous   Start Date of Last Cycle - - - - - - -   Planned next cycle start date - - - - - - 3/3/2021   Doses missed in last 2 weeks - - - - 1 - 0   Adherence Assessment - - - - Adherent - Adherent   Adverse Effects - - - - No AE identified during assessment - Diarrhea   Diarrhea - - - - - - Grade 1   Pharmacist Intervention(diarrhea) - - - - - - Yes   Intervention(s) - - - - - - Patient education   Any new drug interactions? - - - - No - No   Is the dose as ordered appropriate for the patient? - - - - Yes  "- Yes   Is the patient currently in pain? - - - - - - -   Has the patient been assessed within the past 6 months for depression? - - - - - - -   Has the patient missed any days of school, work, or other routine activity? - - - - - - -   Since the last time we talked, have you been hospitalized or used the emergency room? - - - - No - -       Last PHQ-2 Score on record:   PHQ-2 ( 1999 Pfizer) 8/14/2015 7/21/2015   Q1: Little interest or pleasure in doing things 0 0   Q2: Feeling down, depressed or hopeless 0 0   PHQ-2 Score 0 0       Vitals:  BP:   BP Readings from Last 1 Encounters:   10/29/21 128/83     Wt Readings from Last 1 Encounters:   10/29/21 84.4 kg (186 lb)     Estimated body surface area is 1.94 meters squared as calculated from the following:    Height as of 9/3/21: 1.6 m (5' 3\").    Weight as of 10/29/21: 84.4 kg (186 lb).    Labs:  No results found for NA within last 30 days.     No results found for K within last 30 days.     No results found for CA within last 30 days.     No results found for Mag within last 30 days.     No results found for Phos within last 30 days.     No results found for ALBUMIN within last 30 days.     No results found for BUN within last 30 days.     No results found for CR within last 30 days.     No results found for AST within last 30 days.     No results found for ALT within last 30 days.     No results found for BILITOTAL within last 30 days.     No results found for WBC within last 30 days.     No results found for HGB within last 30 days.     No results found for PLT within last 30 days.     No results found for ANC within last 30 days.     No results found for ANC within last 30 days.        Assessment/Plan:  Cait is tolerating Verzenio well.    Follow-Up:  3/24/22: monthly assessment    Refill Due:  [facility] will deliver on 2/23/22.     Mee Isaac, PharmD  Hematology/Oncology Clinical Pharmacist  [facility]  [facility] " [facility]  [phone]

## 2022-03-18 DIAGNOSIS — C50.919 METASTATIC BREAST CANCER: Primary | ICD-10-CM

## 2022-03-24 ENCOUNTER — TELEPHONE (OUTPATIENT)
Dept: ONCOLOGY | Facility: CLINIC | Age: 67
End: 2022-03-24
Payer: COMMERCIAL

## 2022-03-24 DIAGNOSIS — C50.919 METASTATIC BREAST CANCER: Primary | ICD-10-CM

## 2022-03-24 RX ORDER — ANASTROZOLE 1 MG/1
1 TABLET ORAL DAILY
Qty: 28 TABLET | Refills: 0 | Status: SHIPPED | OUTPATIENT
Start: 2022-03-24 | End: 2022-04-21

## 2022-03-24 NOTE — TELEPHONE ENCOUNTER
Oral Chemotherapy Monitoring Program    Subjective/Objective:  Cait Raza is a 67 year old female contacted by phone for a follow-up visit for oral chemotherapy.  Pt confirms taking the appropriate dose of Verzenio, 150 mg twice daily and anastrazole 1 mg daily. Denies recent hospital or ED visits. Patient has not had any recent medication changes. Pt states she misses doses maybe once per week, and she is trying to improve this. Pt states she is currently experiencing diarrhea twice a week that resolves without intervention.    Next cycle starts 3/31/22.    Pt is still in [place] for the winter, and is being managed by an oncologist at [facility] (in [place])  ORAL CHEMOTHERAPY 1/20/2022 1/20/2022 1/21/2022 2/17/2022 2/17/2022 3/24/2022 3/24/2022   Assessment Type Refill Left Voicemail Monthly Follow up Refill Monthly Follow up Refill Monthly Follow up   Diagnosis Code Breast Cancer Breast Cancer Breast Cancer Breast Cancer Breast Cancer Breast Cancer Breast Cancer   Providers Dr. Carina Lim   [facility]   Drug Name Verzenio (abemaciclib) Verzenio (abemaciclib) Verzenio (abemaciclib) Verzenio (abemaciclib) Verzenio (abemaciclib) Verzenio (abemaciclib) Verzenio (abemaciclib)   Dose 150 mg 150 mg 150 mg 150 mg 150 mg 150 mg 150 mg   Current Schedule BID BID BID BID BID BID BID   Cycle Details Continuous Continuous Continuous Continuous Continuous Continuous Continuous   Start Date of Last Cycle - - - - - - -   Planned next cycle start date - - - - 3/3/2021 - 3/31/2022   Doses missed in last 2 weeks - - 1 - 0 - 2   Adherence Assessment - - Adherent - Adherent - Adherent   Adverse Effects - - No AE identified during assessment - Diarrhea - -   Diarrhea - - - - Grade 1 - Grade 1   Pharmacist Intervention(diarrhea) - - - - Yes - No   Intervention(s) - - - - Patient education - -   Any new drug interactions? - - No -  "No - No   Is the dose as ordered appropriate for the patient? - - Yes - Yes - Yes   Is the patient currently in pain? - - - - - - -   Has the patient been assessed within the past 6 months for depression? - - - - - - -   Has the patient missed any days of school, work, or other routine activity? - - - - - - -   Since the last time we talked, have you been hospitalized or used the emergency room? - - No - - - No       Last PHQ-2 Score on record:   PHQ-2 ( 1999 Pfizer) 8/14/2015 7/21/2015   Q1: Little interest or pleasure in doing things 0 0   Q2: Feeling down, depressed or hopeless 0 0   PHQ-2 Score 0 0       Vitals:  BP:   BP Readings from Last 1 Encounters:   10/29/21 128/83     Wt Readings from Last 1 Encounters:   10/29/21 84.4 kg (186 lb)     Estimated body surface area is 1.94 meters squared as calculated from the following:    Height as of 9/3/21: 1.6 m (5' 3\").    Weight as of 10/29/21: 84.4 kg (186 lb).    Labs:  No results found for NA within last 30 days.     No results found for K within last 30 days.     No results found for CA within last 30 days.     No results found for Mag within last 30 days.     No results found for Phos within last 30 days.     No results found for ALBUMIN within last 30 days.     No results found for BUN within last 30 days.     No results found for CR within last 30 days.     No results found for AST within last 30 days.     No results found for ALT within last 30 days.     No results found for BILITOTAL within last 30 days.     No results found for WBC within last 30 days.     No results found for HGB within last 30 days.     No results found for PLT within last 30 days.     No results found for ANC within last 30 days.     No results found for ANC within last 30 days.      Assessment/Plan:  Pt continues to tolerate Verzenio and anastrazole well with some manageable grade 1 diarrhea. Continue current therapy as planned.    Follow-Up:  4/1: Check for monthly labs in  at " [facility].    Refill Due:  [facility] to deliver Verzenio and anastrazole 3/29 for 3/31 cycle start.    Grace Myhre, PharmD  Hematology/Oncology Pharmacist  [facility]  [facility]  [phone]

## 2022-04-15 DIAGNOSIS — C50.919 METASTATIC BREAST CANCER: Primary | ICD-10-CM

## 2022-04-18 DIAGNOSIS — C50.919 METASTATIC BREAST CANCER: Primary | ICD-10-CM

## 2022-04-18 RX ORDER — ANASTROZOLE 1 MG/1
1 TABLET ORAL DAILY
Qty: 28 TABLET | Refills: 0 | Status: SHIPPED | OUTPATIENT
Start: 2022-04-18 | End: 2022-05-16

## 2022-05-13 ENCOUNTER — DOCUMENTATION ONLY (OUTPATIENT)
Dept: ONCOLOGY | Facility: CLINIC | Age: 67
End: 2022-05-13
Payer: COMMERCIAL

## 2022-06-05 ENCOUNTER — HEALTH MAINTENANCE LETTER (OUTPATIENT)
Age: 67
End: 2022-06-05

## 2022-09-02 ENCOUNTER — APPOINTMENT (OUTPATIENT)
Dept: GENERAL RADIOLOGY | Facility: CLINIC | Age: 67
End: 2022-09-02
Attending: NURSE PRACTITIONER
Payer: COMMERCIAL

## 2022-09-02 ENCOUNTER — HOSPITAL ENCOUNTER (EMERGENCY)
Facility: CLINIC | Age: 67
Discharge: HOME | End: 2022-09-02
Attending: NURSE PRACTITIONER | Admitting: NURSE PRACTITIONER
Payer: COMMERCIAL

## 2022-09-02 VITALS
WEIGHT: 175 LBS | DIASTOLIC BLOOD PRESSURE: 73 MMHG | SYSTOLIC BLOOD PRESSURE: 128 MMHG | HEIGHT: 63 IN | BODY MASS INDEX: 31.01 KG/M2 | RESPIRATION RATE: 16 BRPM | TEMPERATURE: 98.6 F | HEART RATE: 70 BPM | OXYGEN SATURATION: 97 %

## 2022-09-02 DIAGNOSIS — E83.51 HYPOCALCEMIA: ICD-10-CM

## 2022-09-02 DIAGNOSIS — J06.9 URI (UPPER RESPIRATORY INFECTION): ICD-10-CM

## 2022-09-02 DIAGNOSIS — R07.0 THROAT PAIN: Primary | ICD-10-CM

## 2022-09-02 DIAGNOSIS — J02.9 PHARYNGITIS: ICD-10-CM

## 2022-09-02 DIAGNOSIS — N30.00 ACUTE CYSTITIS WITHOUT HEMATURIA: ICD-10-CM

## 2022-09-02 LAB
ALBUMIN SERPL-MCNC: 2.8 G/DL (ref 3.4–5)
ALBUMIN UR-MCNC: 30 MG/DL
ALP SERPL-CCNC: 65 U/L (ref 40–150)
ALT SERPL W P-5'-P-CCNC: 31 U/L (ref 0–50)
ANION GAP SERPL CALCULATED.3IONS-SCNC: 6 MMOL/L (ref 3–14)
APPEARANCE UR: ABNORMAL
AST SERPL W P-5'-P-CCNC: 17 U/L (ref 0–45)
BASOPHILS # BLD AUTO: 0.1 10E3/UL (ref 0–0.2)
BASOPHILS NFR BLD AUTO: 1 %
BILIRUB SERPL-MCNC: 0.5 MG/DL (ref 0.2–1.3)
BILIRUB UR QL STRIP: NEGATIVE
BUN SERPL-MCNC: 17 MG/DL (ref 7–30)
CALCIUM SERPL-MCNC: 7.6 MG/DL (ref 8.5–10.1)
CHLORIDE BLD-SCNC: 112 MMOL/L (ref 94–109)
CO2 SERPL-SCNC: 23 MMOL/L (ref 20–32)
COLOR UR AUTO: YELLOW
CREAT SERPL-MCNC: 0.96 MG/DL (ref 0.52–1.04)
DEPRECATED S PYO AG THROAT QL EIA: NEGATIVE
EOSINOPHIL # BLD AUTO: 0.1 10E3/UL (ref 0–0.7)
EOSINOPHIL NFR BLD AUTO: 2 %
ERYTHROCYTE [DISTWIDTH] IN BLOOD BY AUTOMATED COUNT: 13.5 % (ref 10–15)
FLUAV RNA SPEC QL NAA+PROBE: NEGATIVE
FLUBV RNA RESP QL NAA+PROBE: NEGATIVE
GFR SERPL CREATININE-BSD FRML MDRD: 65 ML/MIN/1.73M2
GLUCOSE BLD-MCNC: 97 MG/DL (ref 70–99)
GLUCOSE UR STRIP-MCNC: NEGATIVE MG/DL
GROUP A STREP BY PCR: NOT DETECTED
HCT VFR BLD AUTO: 39.5 % (ref 35–47)
HGB BLD-MCNC: 13.2 G/DL (ref 11.7–15.7)
HGB UR QL STRIP: ABNORMAL
IMM GRANULOCYTES # BLD: 0 10E3/UL
IMM GRANULOCYTES NFR BLD: 0 %
KETONES UR STRIP-MCNC: ABNORMAL MG/DL
LEUKOCYTE ESTERASE UR QL STRIP: ABNORMAL
LYMPHOCYTES # BLD AUTO: 1.1 10E3/UL (ref 0.8–5.3)
LYMPHOCYTES NFR BLD AUTO: 25 %
MCH RBC QN AUTO: 33.2 PG (ref 26.5–33)
MCHC RBC AUTO-ENTMCNC: 33.4 G/DL (ref 31.5–36.5)
MCV RBC AUTO: 99 FL (ref 78–100)
MONOCYTES # BLD AUTO: 0.4 10E3/UL (ref 0–1.3)
MONOCYTES NFR BLD AUTO: 9 %
MUCOUS THREADS #/AREA URNS LPF: PRESENT /LPF
NEUTROPHILS # BLD AUTO: 2.8 10E3/UL (ref 1.6–8.3)
NEUTROPHILS NFR BLD AUTO: 63 %
NITRATE UR QL: NEGATIVE
NRBC # BLD AUTO: 0 10E3/UL
NRBC BLD AUTO-RTO: 0 /100
PH UR STRIP: 6 [PH] (ref 5–7)
PLATELET # BLD AUTO: 246 10E3/UL (ref 150–450)
POTASSIUM BLD-SCNC: 3.5 MMOL/L (ref 3.4–5.3)
PROT SERPL-MCNC: 5.8 G/DL (ref 6.8–8.8)
RBC # BLD AUTO: 3.98 10E6/UL (ref 3.8–5.2)
RBC URINE: 7 /HPF
RSV RNA SPEC NAA+PROBE: NEGATIVE
SARS-COV-2 RNA RESP QL NAA+PROBE: NEGATIVE
SODIUM SERPL-SCNC: 141 MMOL/L (ref 133–144)
SP GR UR STRIP: 1.03 (ref 1–1.03)
SQUAMOUS EPITHELIAL: 2 /HPF
UROBILINOGEN UR STRIP-MCNC: NORMAL MG/DL
WBC # BLD AUTO: 4.5 10E3/UL (ref 4–11)
WBC URINE: >182 /HPF

## 2022-09-02 PROCEDURE — 96365 THER/PROPH/DIAG IV INF INIT: CPT

## 2022-09-02 PROCEDURE — C9803 HOPD COVID-19 SPEC COLLECT: HCPCS

## 2022-09-02 PROCEDURE — 81001 URINALYSIS AUTO W/SCOPE: CPT | Performed by: NURSE PRACTITIONER

## 2022-09-02 PROCEDURE — 258N000003 HC RX IP 258 OP 636: Performed by: NURSE PRACTITIONER

## 2022-09-02 PROCEDURE — 85025 COMPLETE CBC W/AUTO DIFF WBC: CPT | Performed by: NURSE PRACTITIONER

## 2022-09-02 PROCEDURE — 87637 SARSCOV2&INF A&B&RSV AMP PRB: CPT | Performed by: NURSE PRACTITIONER

## 2022-09-02 PROCEDURE — 82040 ASSAY OF SERUM ALBUMIN: CPT | Performed by: NURSE PRACTITIONER

## 2022-09-02 PROCEDURE — 87086 URINE CULTURE/COLONY COUNT: CPT | Performed by: NURSE PRACTITIONER

## 2022-09-02 PROCEDURE — 250N000011 HC RX IP 250 OP 636: Performed by: NURSE PRACTITIONER

## 2022-09-02 PROCEDURE — 36415 COLL VENOUS BLD VENIPUNCTURE: CPT | Performed by: NURSE PRACTITIONER

## 2022-09-02 PROCEDURE — 71046 X-RAY EXAM CHEST 2 VIEWS: CPT

## 2022-09-02 PROCEDURE — 99284 EMERGENCY DEPT VISIT MOD MDM: CPT | Mod: CS,25

## 2022-09-02 PROCEDURE — 96361 HYDRATE IV INFUSION ADD-ON: CPT

## 2022-09-02 PROCEDURE — 80053 COMPREHEN METABOLIC PANEL: CPT | Performed by: NURSE PRACTITIONER

## 2022-09-02 PROCEDURE — 87651 STREP A DNA AMP PROBE: CPT | Performed by: EMERGENCY MEDICINE

## 2022-09-02 PROCEDURE — 87637 SARSCOV2&INF A&B&RSV AMP PRB: CPT | Performed by: EMERGENCY MEDICINE

## 2022-09-02 RX ORDER — ABEMACICLIB 150 MG/1
TABLET ORAL
COMMUNITY
Start: 2022-08-22

## 2022-09-02 RX ORDER — CEPHALEXIN 500 MG/1
500 CAPSULE ORAL 4 TIMES DAILY
Qty: 40 CAPSULE | Refills: 0 | Status: SHIPPED | OUTPATIENT
Start: 2022-09-02 | End: 2022-09-12

## 2022-09-02 RX ORDER — CEFTRIAXONE 1 G/1
1 INJECTION, POWDER, FOR SOLUTION INTRAMUSCULAR; INTRAVENOUS ONCE
Status: COMPLETED | OUTPATIENT
Start: 2022-09-02 | End: 2022-09-02

## 2022-09-02 RX ADMIN — CEFTRIAXONE SODIUM 1 G: 1 INJECTION, POWDER, FOR SOLUTION INTRAMUSCULAR; INTRAVENOUS at 17:29

## 2022-09-02 RX ADMIN — SODIUM CHLORIDE 1000 ML: 9 INJECTION, SOLUTION INTRAVENOUS at 15:48

## 2022-09-02 ASSESSMENT — ENCOUNTER SYMPTOMS
SHORTNESS OF BREATH: 1
COUGH: 1
MYALGIAS: 1
ARTHRALGIAS: 1
FEVER: 0
HEMATURIA: 0
NAUSEA: 0
DYSURIA: 0
SORE THROAT: 1
VOMITING: 1
DIFFICULTY URINATING: 0

## 2022-09-02 ASSESSMENT — ACTIVITIES OF DAILY LIVING (ADL)
ADLS_ACUITY_SCORE: 35
ADLS_ACUITY_SCORE: 33
ADLS_ACUITY_SCORE: 35

## 2022-09-02 NOTE — ED TRIAGE NOTES
Cold like symptoms since Tuesday - sore throat, ear pain, body aches, fever  Hx of breast cancer   Fells like she might be dehydrated      Triage Assessment     Row Name 09/02/22 1031       Triage Assessment (Adult)    Airway WDL WDL       Respiratory WDL    Respiratory WDL WDL       Cardiac WDL    Cardiac WDL WDL       Cognitive/Neuro/Behavioral WDL    Cognitive/Neuro/Behavioral WDL WDL

## 2022-09-02 NOTE — DISCHARGE INSTRUCTIONS
Discharge Instructions  Urinary Tract Infection  You or your child have been diagnosed with a urinary tract infection, or UTI. The urinary tract includes the kidneys (which make urine/pee), ureters (the tubes that carry urine/pee from the kidneys to the bladder), the bladder (which stores urine/pee), and urethra (the tube that carries urine/pee out of the bladder). Urinary tract infections occur when bacteria travel up the urethra into the bladder (bladder infection) and, in some cases, from there into the kidneys (kidney infection).  Generally, every Emergency Department visit should have a follow-up clinic visit with either a primary or a specialty clinic/provider. Please follow-up as instructed by your emergency provider today.  Return to the Emergency Department if:  You or your child have severe back pain.  You or your child are vomiting (throwing up) so that you cannot take your medicine.  You or your child have a new fever (had not previously had a fever) over 101 F.  You or your child have confusion or are very weak, or feel very ill.  Your child seems much more ill, will not wake up, will not respond right, or is crying for a long time and will not calm down.  You or your child are showing signs of dehydration. These signs may include decreased urination (pee), dry mouth/gums/tongue, or decreased activity.    Follow-up with your provider:   Children under 24 months need to be seen by their regular provider within one week after a diagnosis of a UTI. It may be necessary to do some more tests to look at the child s kidney or bladder.  You should begin to feel better within 24 - 48 hours of starting your antibiotic; follow-up with your regular clinic/doctor/provider if this is not the case.    Treatment:   You will be treated with an antibiotic to kill the bacteria. We have to make an educated guess, based on what we know about common bacteria and antibiotics, as to which antibiotic will work for your  "infection. We will be correct most times but there will be some cases where the antibiotic chosen is not correct (see urine cultures below).  Take a pain medication such as acetaminophen (Tylenol ) or ibuprofen (Advil , Motrin , Nuprin ).  Phenazopyridine (Pyridium , Uristat ) is a prescription medication that numbs the bladder to reduce the burning pain of some UTIs.  The same medication is available in a non-prescription version (Azo-Standard , Urodol ). This medication will change the color of the urine and tears (usually blue or orange). If you wear contacts, do not wear them while taking this medication as they may be stained by the medication.    Urine Cultures:  If indicated, a urine culture may have been performed today. This test generally takes 24-48 hours to complete so the results are not known at this time. The results can confirm that an infection is present but also determine which antibiotic is effective for the specific bacteria that is causing the infection. If your urine culture shows that the antibiotic you were given today will not work to treat your infection, we will attempt to contact you to make arrangements to change the antibiotic. If the culture confirms that the antibiotic is effective for your infection, you will not be contacted. We often recommend follow-up with your regular physician/provider on the culture results regardless of this process.    Antibiotic Warning:   If you have been placed on antibiotics - watch for signs of allergic reaction.  These include rash, lip swelling, difficulty breathing, wheezing, and dizziness.  If you develop any of these symptoms, stop the antibiotic immediately and go to an emergency room or urgent care for evaluation.    Probiotics: If you have been given an antibiotic, you may want to also take a probiotic pill or eat yogurt with live cultures. Probiotics have \"good bacteria\" to help your intestines stay healthy. Studies have shown that probiotics " help prevent diarrhea and other intestine problems (including C. diff infection) when you take antibiotics. You can buy these without a prescription in the pharmacy section of the store.   If you were given a prescription for medicine here today, be sure to read all of the information (including the package insert) that comes with your prescription.  This will include important information about the medicine, its side effects, and any warnings that you need to know about.  The pharmacist who fills the prescription can provide more information and answer questions you may have about the medicine.  If you have questions or concerns that the pharmacist cannot address, please call or return to the Emergency Department.   Remember that you can always come back to the Emergency Department if you are not able to see your regular provider in the amount of time listed above, if you get any new symptoms, or if there is anything that worries you.  Discharge Instructions  Upper Respiratory Infection    The upper respiratory tract includes the sinuses, nasal passages, pharynx, and larynx. A URI, or upper respiratory infection, is an infection of any of the parts of the upper airway. Symptoms include runny nose, congestion, sneezing, sore throat, cough, and fever. URIs are almost always caused by a virus. Antibiotics do not help with viral infections, so are generally not prescribed. A URI is very contagious through coughing and nasal secretions; make sure you wash your hands often and clean surfaces after sneezing, coughing or touching them. While you should start to improve in 3 - 5 days, remember that sometimes a cough can linger for several weeks.    Generally, every Emergency Department visit should have a follow-up clinic visit with either a primary or a specialty clinic/provider. Please follow-up as instructed by your emergency provider today.    Return to the Emergency Department if:  Any of your symptoms get much  worse.  You seem very sick, like being too weak to get up.  You have chest pain or shortness of breath.   You have a severe headache.  You are vomiting (throwing up) so much you cannot keep fluids or medicines down.  You have confusion or seem unusually drowsy.  You have a seizure.    What can I do to help myself?  Fill any prescriptions the provider gave you and take them right away  If you have a fever, get plenty of rest and drink lots of fluids, especially water.  Using a humidifier or saline nose spray will also help loosen mucous.   Clothes or blankets will not change your fever. Do what is comfortable for you.  Bathing or sponging in lukewarm water may help you feel better.  Acetaminophen (Tylenol ) or ibuprofen (Advil , Motrin ) will help bring fever down and may help you feel more comfortable. Be sure to read and follow the package directions, and ask your provider if you have questions.  Do not drink alcohol.  Decongestants may help you feel better. You may use decongestant nose sprays Afrin  (oxymetazoline) or Nikko-Synephrine  (phenylephrine hydrochloride) for up to 3 days, or may use a decongestant tablet like Sudafed  (pseudoephedrine).  If you were given a prescription for medicine here today, be sure to read all of the information (including the package insert) that comes with your prescription.  This will include important information about the medicine, its side effects, and any warnings that you need to know about.  The pharmacist who fills the prescription can provide more information and answer questions you may have about the medicine.  If you have questions or concerns that the pharmacist cannot address, please call or return to the Emergency Department.   Remember that you can always come back to the Emergency Department if you are not able to see your regular provider in the amount of time listed above, if you get any new symptoms, or if there is anything that worries you.

## 2022-09-02 NOTE — ED PROVIDER NOTES
History   Chief Complaint:  Cold Symptoms     The history is provided by the patient and medical records.      Cait Raza is a 67 year old female with history of metastatic breast cancer(Anastrazole, Abemaciclib), paroxysmal atrial tachycardia (metoprolol), hyperlipidemia  (pravastatin), and hypercholesterolemia who presents with cold symptoms. The patient states she has had persistent cold symptoms for the past week. She endorses myalgias, arthralgias, sore throat, otalgia, intermittent shortness of breath, intermittent cough, and intermittent fever. She notes her highest temperature was 102.7 at home yesterday. She endorses one episode of emesis last night. She denies nausea. She denies chest pain. She denies dysuria, hematuria, or difficulty urinating. She endorses decreased urination. She was advised to present to the emergency department by her oncology team for evaluation of dehydration.  She has taken Tylenol for symptom management. She is currently on chemotherapy, Abemaciclib, for metastatic breast cancer. She was also instructed to stop taking her Verzenio but continue Anastrazole.  She notes her chemotherapy medications make her dehydrated and is currently being evaluated for this by her oncologist, Dr. Robbins, at [facility]. Her last infusion was on 06/22. She endorses chronic reduced appetite due to chemotherapy. She notes being around a family member with upper respiratory infection just prior to onset of her symptoms.    Review of Systems   Constitutional: Negative for fever.   HENT: Positive for ear pain and sore throat.    Respiratory: Positive for cough and shortness of breath.    Cardiovascular: Negative for chest pain.   Gastrointestinal: Positive for vomiting. Negative for nausea.   Genitourinary: Positive for decreased urine volume. Negative for difficulty urinating, dysuria and hematuria.   Musculoskeletal: Positive for arthralgias and myalgias.   All other systems reviewed and are  "negative.    Allergies:  Bupropion  Citalopram    Medications:  Arimidex   Zofran  Pravachol  Compazine  Zoloft   Toprol     Past Medical History:     Breast neoplasm   Osteoarthritis of knee  Metastatic breast cancer  Bone metastasis    Osteoporosis   Supraventricular tachycardia   Secondary malignant neoplasm of skin   Secondary malignant neoplasm of bone   Malignant neoplasm of female breast   Malignant melanoma of right lower limb including hip  Hypercholesterolemia   Hyperlipidemia   Colon polyp   Depression   Anxiety   Paroxysmal atrial tachycardia   Degenerative joint disease  Lobular carcinoma of right female breast   Atypical lobular hyperplasia of both breasts     Past Surgical History:    Breast lesion excision   Knee arthoplasty, bilateral   Knee meniscus repair   section (x2)    Mastectomy, bilateral     Family History:    Mother: Arthritis, Dementia   Father: Hypertension, Prostate Cancer   Brother: Substance Abuse     Social History:  The patient was unaccompanied to the emergency department.  PCP: Mariola Arreola     Physical Exam     Patient Vitals for the past 24 hrs:   BP Temp Temp src Pulse Resp SpO2 Height Weight   22 1023 118/69 98.6  F (37  C) Temporal 70 16 98 % 1.6 m (5' 3\") 79.4 kg (175 lb)     Physical Exam  Nursing notes reviewed. Vitals reviewed.  General: Alert. Well kept.  Eyes:  Conjunctiva non-injected, non-icteric.  Neck/Throat: Moist mucous membranes.  Normal voice.  Tonsils 2+, uvula midline.  No sublingual swelling or tenderness.  No trismus.  No nuchal rigidity.  Cardiac: Regular rhythm. Normal heart sounds with no murmur/rubs/click.   Pulmonary: Clear and equal breath sounds bilaterally. No crackles/rales. No wheezing  Abdomen: Soft. Non-distended. Non-tender to palpation. No masses. No guarding or rebound.  Musculoskeletal: Normal gross range of motion of all 4 extremities.    Neurological: Alert and oriented x4.   Skin: Warm and dry without rashes or " petechiae. Normal appearance of visualized exposed skin.  Psych: Affect normal. Good eye contact.    Emergency Department Course   Imaging:  Chest XR,  PA & LAT   Final Result   IMPRESSION: There are no acute infiltrates. The cardiac silhouette is   not enlarged. Pulmonary vasculature is unremarkable.      RADHA MC MD            SYSTEM ID:  [id number]      Report per radiology    Laboratory:  Labs Ordered and Resulted from Time of ED Arrival to Time of ED Departure   COMPREHENSIVE METABOLIC PANEL - Abnormal       Result Value    Sodium 141      Potassium 3.5      Chloride 112 (*)     Carbon Dioxide (CO2) 23      Anion Gap 6      Urea Nitrogen 17      Creatinine 0.96      Calcium 7.6 (*)     Glucose 97      Alkaline Phosphatase 65      AST 17      ALT 31      Protein Total 5.8 (*)     Albumin 2.8 (*)     Bilirubin Total 0.5      GFR Estimate 65     ROUTINE UA WITH MICROSCOPIC REFLEX TO CULTURE - Abnormal    Color Urine Yellow      Appearance Urine Slightly Cloudy (*)     Glucose Urine Negative      Bilirubin Urine Negative      Ketones Urine Trace (*)     Specific Gravity Urine 1.026      Blood Urine Trace (*)     pH Urine 6.0      Protein Albumin Urine 30  (*)     Urobilinogen Urine Normal      Nitrite Urine Negative      Leukocyte Esterase Urine Large (*)     Mucus Urine Present (*)     RBC Urine 7 (*)     WBC Urine >182 (*)     Squamous Epithelials Urine 2 (*)    CBC WITH PLATELETS AND DIFFERENTIAL - Abnormal    WBC Count 4.5      RBC Count 3.98      Hemoglobin 13.2      Hematocrit 39.5      MCV 99      MCH 33.2 (*)     MCHC 33.4      RDW 13.5      Platelet Count 246      % Neutrophils 63      % Lymphocytes 25      % Monocytes 9      % Eosinophils 2      % Basophils 1      % Immature Granulocytes 0      NRBCs per 100 WBC 0      Absolute Neutrophils 2.8      Absolute Lymphocytes 1.1      Absolute Monocytes 0.4      Absolute Eosinophils 0.1      Absolute Basophils 0.1      Absolute Immature Granulocytes 0.0       Absolute NRBCs 0.0     INFLUENZA A/B & SARS-COV2 PCR MULTIPLEX - Normal    Influenza A PCR Negative      Influenza B PCR Negative      RSV PCR Negative      SARS CoV2 PCR Negative     URINE CULTURE   Rapid Strep A: Negative   Group A Streptococcus PCR: Not detected     Emergency Department Course:     Reviewed:  I reviewed nursing notes, vitals, past medical history and Care Everywhere    Assessments:  1429 I obtained history and examined the patient as noted above.   1800 I rechecked the patient and explained findings.  1840 I rechecked the patient and explained findings.    Consults:  1830 I spoke the oncology service from [facility], Dr. Tello, regarding patient's presentation, findings, and plan of care.     Interventions:  1548 0.9% sodium chloride BOLUS 1000 mL IV  1729 Rocephin 1 g IV    Disposition:  The patient was discharged to home.     Impression & Plan   Medical Decision Making:  Cait Raza is a 67 year old female, with breast cancer on chemotherapeutic agents, presenting w/ multiple symptoms.  The patient is afebrile today and has no leukocytosis with WBC 4.5.  She has a stable hemoglobin and normal platelets.  Her kidney function returns normal at 0.96.  She does have hypocalcemia at 7.6.  UA returns concerning for urinary tract infection and UC pending.  There has been no back/flank pain or significant abdominal pain.  Doubt pyelonephritis, appendicitis, colitis, diverticulitis, infected ureteral stone or any intraabdominal catastrophe given history and physical exam.  The patient will be started on antibiotics for the infection, and ceftriaxone given in ED.   No indication for admission at this time and patient is appropriate for discharge in consultation with [facility].  Recommendations given regarding follow up with PCP and oncology and return to the emergency department as needed for new or worsening symptoms.  The patient was counseled on all results, disposition and diagnosis.   They are understanding and agreeable to plan. Patient discharged in stable condition.      Diagnosis:    ICD-10-CM    1. Acute cystitis without hematuria  N30.00    2. Hypocalcemia  E83.51    3. URI (upper respiratory infection)  J06.9    4. Pharyngitis  J02.9      Discharge Medications:  New Prescriptions    CEPHALEXIN (KEFLEX) 500 MG CAPSULE    Take 1 capsule (500 mg) by mouth 4 times daily for 10 days     Scribe Disclosure:  I, Dee Canela, am serving as a scribe at 2:26 PM on 9/2/2022 to document services personally performed by Najma Emanuel, KATIA based on my observations and the provider's statements to me.     Najma Emanuel, CNP  09/02/22 1190

## 2022-09-04 LAB — BACTERIA UR CULT: NORMAL

## 2022-10-15 ENCOUNTER — HEALTH MAINTENANCE LETTER (OUTPATIENT)
Age: 67
End: 2022-10-15

## 2023-04-03 PROBLEM — C50.919 PRIMARY MALIGNANT NEOPLASM OF BREAST WITH METASTASIS (H): Status: ACTIVE | Noted: 2021-08-06

## 2023-04-03 PROBLEM — C79.51 MALIGNANT NEOPLASM METASTATIC TO BONE (H): Status: ACTIVE | Noted: 2021-09-06

## 2023-10-29 ENCOUNTER — HEALTH MAINTENANCE LETTER (OUTPATIENT)
Age: 68
End: 2023-10-29

## 2024-12-21 ENCOUNTER — HEALTH MAINTENANCE LETTER (OUTPATIENT)
Age: 69
End: 2024-12-21